# Patient Record
Sex: MALE | Race: WHITE
[De-identification: names, ages, dates, MRNs, and addresses within clinical notes are randomized per-mention and may not be internally consistent; named-entity substitution may affect disease eponyms.]

---

## 2020-08-24 ENCOUNTER — HOSPITAL ENCOUNTER (OUTPATIENT)
Dept: HOSPITAL 47 - EC | Age: 20
Setting detail: OBSERVATION
LOS: 2 days | Discharge: HOME | End: 2020-08-26
Payer: COMMERCIAL

## 2020-08-24 VITALS — BODY MASS INDEX: 22.3 KG/M2

## 2020-08-24 DIAGNOSIS — N18.1: ICD-10-CM

## 2020-08-24 DIAGNOSIS — R60.0: Primary | ICD-10-CM

## 2020-08-24 DIAGNOSIS — E87.1: ICD-10-CM

## 2020-08-24 DIAGNOSIS — E87.70: ICD-10-CM

## 2020-08-24 DIAGNOSIS — Z79.52: ICD-10-CM

## 2020-08-24 DIAGNOSIS — T50.2X5A: ICD-10-CM

## 2020-08-24 DIAGNOSIS — E87.6: ICD-10-CM

## 2020-08-24 DIAGNOSIS — J45.909: ICD-10-CM

## 2020-08-24 DIAGNOSIS — N03.8: ICD-10-CM

## 2020-08-24 DIAGNOSIS — Z88.0: ICD-10-CM

## 2020-08-24 LAB
ALBUMIN SERPL-MCNC: 2.1 G/DL (ref 3.5–5)
ALBUMIN SERPL-MCNC: 2.1 G/DL (ref 3.5–5)
ALP SERPL-CCNC: 70 U/L (ref 38–126)
ALP SERPL-CCNC: 75 U/L (ref 38–126)
ALT SERPL-CCNC: 18 U/L (ref 4–49)
ALT SERPL-CCNC: 19 U/L (ref 4–49)
ANION GAP SERPL CALC-SCNC: -1 MMOL/L
ANION GAP SERPL CALC-SCNC: -1 MMOL/L
AST SERPL-CCNC: 28 U/L (ref 17–59)
AST SERPL-CCNC: 28 U/L (ref 17–59)
BASOPHILS # BLD AUTO: 0 K/UL (ref 0–0.2)
BASOPHILS NFR BLD AUTO: 0 %
BUN SERPL-SCNC: 11 MG/DL (ref 9–20)
BUN SERPL-SCNC: 11 MG/DL (ref 9–20)
CALCIUM SPEC-MCNC: 7.6 MG/DL (ref 8.4–10.2)
CALCIUM SPEC-MCNC: 7.7 MG/DL (ref 8.4–10.2)
CHLORIDE SERPL-SCNC: 105 MMOL/L (ref 98–107)
CHLORIDE SERPL-SCNC: 106 MMOL/L (ref 98–107)
CO2 SERPL-SCNC: 26 MMOL/L (ref 22–30)
CO2 SERPL-SCNC: 26 MMOL/L (ref 22–30)
EOSINOPHIL # BLD AUTO: 0.1 K/UL (ref 0–0.7)
EOSINOPHIL NFR BLD AUTO: 2 %
ERYTHROCYTE [DISTWIDTH] IN BLOOD BY AUTOMATED COUNT: 5.92 M/UL (ref 4.3–5.9)
ERYTHROCYTE [DISTWIDTH] IN BLOOD: 12.6 % (ref 11.5–15.5)
GLUCOSE SERPL-MCNC: 104 MG/DL (ref 74–99)
GLUCOSE SERPL-MCNC: 115 MG/DL (ref 74–99)
HCT VFR BLD AUTO: 50.2 % (ref 39–53)
HGB BLD-MCNC: 16.5 GM/DL (ref 13–17.5)
INR PPP: 1 (ref ?–1.2)
LYMPHOCYTES # SPEC AUTO: 0.6 K/UL (ref 1–4.8)
LYMPHOCYTES NFR SPEC AUTO: 11 %
MAGNESIUM SPEC-SCNC: 1.9 MG/DL (ref 1.6–2.3)
MCH RBC QN AUTO: 27.9 PG (ref 25–35)
MCHC RBC AUTO-ENTMCNC: 32.9 G/DL (ref 31–37)
MCV RBC AUTO: 84.8 FL (ref 80–100)
MONOCYTES # BLD AUTO: 0.2 K/UL (ref 0–1)
MONOCYTES NFR BLD AUTO: 4 %
NEUTROPHILS # BLD AUTO: 4.8 K/UL (ref 1.3–7.7)
NEUTROPHILS NFR BLD AUTO: 83 %
PH UR: 7.5 [PH] (ref 5–8)
PLATELET # BLD AUTO: 298 K/UL (ref 150–450)
POTASSIUM SERPL-SCNC: 4.2 MMOL/L (ref 3.5–5.1)
POTASSIUM SERPL-SCNC: 4.6 MMOL/L (ref 3.5–5.1)
PROT SERPL-MCNC: 4.6 G/DL (ref 6.3–8.2)
PROT SERPL-MCNC: 4.6 G/DL (ref 6.3–8.2)
PROT UR QL: (no result)
PT BLD: 10 SEC (ref 9–12)
RBC UR QL: 1 /HPF (ref 0–5)
SODIUM SERPL-SCNC: 130 MMOL/L (ref 137–145)
SODIUM SERPL-SCNC: 131 MMOL/L (ref 137–145)
SP GR UR: 1.03 (ref 1–1.03)
UROBILINOGEN UR QL STRIP: 2 MG/DL (ref ?–2)
WBC # BLD AUTO: 5.9 K/UL (ref 4–11)
WBC # UR AUTO: 2 /HPF (ref 0–5)

## 2020-08-24 PROCEDURE — 86255 FLUORESCENT ANTIBODY SCREEN: CPT

## 2020-08-24 PROCEDURE — 77012 CT SCAN FOR NEEDLE BIOPSY: CPT

## 2020-08-24 PROCEDURE — 84156 ASSAY OF PROTEIN URINE: CPT

## 2020-08-24 PROCEDURE — 84165 PROTEIN E-PHORESIS SERUM: CPT

## 2020-08-24 PROCEDURE — 86900 BLOOD TYPING SEROLOGIC ABO: CPT

## 2020-08-24 PROCEDURE — 86901 BLOOD TYPING SEROLOGIC RH(D): CPT

## 2020-08-24 PROCEDURE — 86335 IMMUNFIX E-PHORSIS/URINE/CSF: CPT

## 2020-08-24 PROCEDURE — 80074 ACUTE HEPATITIS PANEL: CPT

## 2020-08-24 PROCEDURE — 86334 IMMUNOFIX E-PHORESIS SERUM: CPT

## 2020-08-24 PROCEDURE — 83735 ASSAY OF MAGNESIUM: CPT

## 2020-08-24 PROCEDURE — 80053 COMPREHEN METABOLIC PANEL: CPT

## 2020-08-24 PROCEDURE — 96361 HYDRATE IV INFUSION ADD-ON: CPT

## 2020-08-24 PROCEDURE — 81001 URINALYSIS AUTO W/SCOPE: CPT

## 2020-08-24 PROCEDURE — 96374 THER/PROPH/DIAG INJ IV PUSH: CPT

## 2020-08-24 PROCEDURE — 86850 RBC ANTIBODY SCREEN: CPT

## 2020-08-24 PROCEDURE — 86160 COMPLEMENT ANTIGEN: CPT

## 2020-08-24 PROCEDURE — 36415 COLL VENOUS BLD VENIPUNCTURE: CPT

## 2020-08-24 PROCEDURE — 85610 PROTHROMBIN TIME: CPT

## 2020-08-24 PROCEDURE — 86225 DNA ANTIBODY NATIVE: CPT

## 2020-08-24 PROCEDURE — 80061 LIPID PANEL: CPT

## 2020-08-24 PROCEDURE — 86038 ANTINUCLEAR ANTIBODIES: CPT

## 2020-08-24 PROCEDURE — 86162 COMPLEMENT TOTAL (CH50): CPT

## 2020-08-24 PROCEDURE — 96376 TX/PRO/DX INJ SAME DRUG ADON: CPT

## 2020-08-24 PROCEDURE — 85025 COMPLETE CBC W/AUTO DIFF WBC: CPT

## 2020-08-24 PROCEDURE — 99285 EMERGENCY DEPT VISIT HI MDM: CPT

## 2020-08-24 PROCEDURE — 82570 ASSAY OF URINE CREATININE: CPT

## 2020-08-24 PROCEDURE — 76770 US EXAM ABDO BACK WALL COMP: CPT

## 2020-08-24 PROCEDURE — 50200 RENAL BIOPSY PERQ: CPT

## 2020-08-24 RX ADMIN — FUROSEMIDE SCH MG: 10 INJECTION, SOLUTION INTRAMUSCULAR; INTRAVENOUS at 14:29

## 2020-08-24 RX ADMIN — FUROSEMIDE SCH MG: 10 INJECTION, SOLUTION INTRAMUSCULAR; INTRAVENOUS at 20:46

## 2020-08-24 NOTE — ED
General Adult HPI





- General


Chief complaint: Recheck/Abnormal Lab/Rx


Stated complaint: kidney issue relaps


Time Seen by Provider: 08/24/20 11:55


Source: patient, RN notes reviewed, old records reviewed


Mode of arrival: ambulatory


Limitations: no limitations





- History of Present Illness


Initial comments: 





This is a 20-year-old male who has a past medical history significant for 

nephrotic syndrome.  Patient states his numbers been rising recently but the 

refusing here today because he had edema increasing in both legs bilaterally.  

Patient also states she's had a little sensation that he has something stuck in 

his throat this happen before when he has swelling in his feet.  Patient denies 

any difficulty breathing shortest breath.  Patient denies any recent fever 

chills or cough per patient denies any chest pain or palpitations patient denies

any abdominal pain patient denies any nausea vomiting or diarrhea.





- Related Data


                                Home Medications











 Medication  Instructions  Recorded  Confirmed


 


Multivitamins, Thera [Theragran] 1 each PO DAILY@1200 04/05/15 04/05/15








                                  Previous Rx's











 Medication  Instructions  Recorded


 


diphenhydrAMINE [Benadryl] 1 - 2 tab PO Q6HR PRN #30 capsule 10/07/16











                                    Allergies











Allergy/AdvReac Type Severity Reaction Status Date / Time


 


Penicillins Allergy  Unknown Verified 08/24/20 11:58














Review of Systems


ROS Statement: 


Those systems with pertinent positive or pertinent negative responses have been 

documented in the HPI.





ROS Other: All systems not noted in ROS Statement are negative.





Past Medical History


Past Medical History: Asthma


Additional Past Medical History / Comment(s): nephrotic syndrome


History of Any Multi-Drug Resistant Organisms: None Reported


Past Surgical History: No Surgical Hx Reported


Past Psychological History: No Psychological Hx Reported


Past Alcohol Use History: None Reported


Past Drug Use History: None Reported





General Exam





- General Exam Comments


Initial Comments: 





GENERAL:


Patient is well-developed and well-nourished.  Patient is nontoxic and well-

hydrated and is in mild distress.





ENT:


Neck is soft and supple.  No significant lymphadenopathy is noted.  Oropharynx 

is clear.  Moist mucous membranes.  Neck has full range of motion without 

eliciting any pain.  





EYES:


The sclera were anicteric and conjunctiva were pink and moist.  Extraocular 

movements were intact and pupils were equal round and reactive to light.  

Eyelids were unremarkable.





PULMONARY:


Unlabored respirations.  Good breath sounds bilaterally.  No audible rales 

rhonchi or wheezing was noted.





CARDIOVASCULAR:


There is a regular rate and rhythm without any murmurs gallops or rubs.  





ABDOMEN:


Soft and nontender with normal bowel sounds. 





SKIN:


Skin is clear with no lesions or rashes and otherwise unremarkable.





NEUROLOGIC:


Patient is alert and oriented x3.  Cranial nerves II through XII are grossly 

intact.  Motor and sensory are also intact.  Normal speech, volume and content. 

 Symmetrical smile.  





MUSCULOSKELETAL:


Normal extremities with adequate strength and full range of motion.  Edema 1+ 

bilaterally





LYMPHATICS:


No significant lymphadenopathy is noted





PSYCHIATRIC:


Normal psychiatric evaluation. 


Limitations: no limitations





Course


                                   Vital Signs











  08/24/20





  11:53


 


Temperature 98.0 F


 


Pulse Rate 84


 


Respiratory 18





Rate 


 


Blood Pressure 123/76


 


O2 Sat by Pulse 99





Oximetry 














Medical Decision Making





- Medical Decision Making





Dr. Trevino came into the emergency department saw the patient wanted the patient

 admitted.





I spoke with Dr. Agosto she agreed to admit the patient admitted the patient 

wrote admitting orders.





- Lab Data


Result diagrams: 


                                 08/24/20 12:26





                                 08/24/20 12:26


                                   Lab Results











  08/24/20 08/24/20 08/24/20 Range/Units





  12:26 12:26 12:26 


 


WBC  5.9    (4.0-11.0)  k/uL


 


RBC  5.92 H    (4.30-5.90)  m/uL


 


Hgb  16.5    (13.0-17.5)  gm/dL


 


Hct  50.2    (39.0-53.0)  %


 


MCV  84.8    (80.0-100.0)  fL


 


MCH  27.9    (25.0-35.0)  pg


 


MCHC  32.9    (31.0-37.0)  g/dL


 


RDW  12.6    (11.5-15.5)  %


 


Plt Count  298    (150-450)  k/uL


 


Neutrophils %  83    %


 


Lymphocytes %  11    %


 


Monocytes %  4    %


 


Eosinophils %  2    %


 


Basophils %  0    %


 


Neutrophils #  4.8    (1.3-7.7)  k/uL


 


Lymphocytes #  0.6 L    (1.0-4.8)  k/uL


 


Monocytes #  0.2    (0-1.0)  k/uL


 


Eosinophils #  0.1    (0-0.7)  k/uL


 


Basophils #  0.0    (0-0.2)  k/uL


 


Sodium    131 L  (137-145)  mmol/L


 


Potassium    4.2  (3.5-5.1)  mmol/L


 


Chloride    106  ()  mmol/L


 


Carbon Dioxide    26  (22-30)  mmol/L


 


Anion Gap    -1  mmol/L


 


BUN    11  (9-20)  mg/dL


 


Creatinine    0.74  (0.66-1.25)  mg/dL


 


Est GFR (CKD-EPI)AfAm    >90  (>60 ml/min/1.73 sqM)  


 


Est GFR (CKD-EPI)NonAf    >90  (>60 ml/min/1.73 sqM)  


 


Glucose    115 H  (74-99)  mg/dL


 


Calcium    7.6 L  (8.4-10.2)  mg/dL


 


Total Bilirubin    0.6  (0.2-1.3)  mg/dL


 


AST    28  (17-59)  U/L


 


ALT    19  (4-49)  U/L


 


Alkaline Phosphatase    75  ()  U/L


 


Total Protein    4.6 L  (6.3-8.2)  g/dL


 


Albumin    2.1 L  (3.5-5.0)  g/dL


 


Urine Color   Yellow   


 


Urine Appearance   Clear   (Clear)  


 


Urine pH   7.5   (5.0-8.0)  


 


Ur Specific Gravity   1.034   (1.001-1.035)  


 


Urine Protein   4+ H   (Negative)  


 


Urine Glucose (UA)   Negative   (Negative)  


 


Urine Ketones   Negative   (Negative)  


 


Urine Blood   Small H   (Negative)  


 


Urine Nitrite   Negative   (Negative)  


 


Urine Bilirubin   Negative   (Negative)  


 


Urine Urobilinogen   2.0   (<2.0)  mg/dL


 


Ur Leukocyte Esterase   Negative   (Negative)  


 


Urine RBC   1   (0-5)  /hpf


 


Urine WBC   2   (0-5)  /hpf


 


Urine Bacteria   Rare H   (None)  /hpf


 


Urine Mucus   Occasional H   (None)  /hpf














Disposition


Clinical Impression: 


 Nephrotic syndrome





Disposition: ADMITTED AS IP TO THIS [facility]


Time of Disposition: 13:39

## 2020-08-24 NOTE — US
EXAMINATION TYPE: US kidneys/renal and bladder

 

DATE OF EXAM: 8/24/2020

 

COMPARISON: NONE

 

CLINICAL HISTORY: 20-year-old male with acute kidney injury, abnormal labs.  Patient states no pain. 
 

 

TECHNIQUE: Multiple sonographic images of the kidneys and bladder are obtained.

 

FINDINGS:

 

EXAM MEASUREMENTS:

 

Right Kidney:  12.3 x 5.9 x 4.4 cm

Left Kidney: 11.4 x 5.2 x 6.3 cm

 

 

 

Right Kidney: No hydronephrosis.

Left Kidney: No hydronephrosis. Possible trace perinephric edema.

 

 

Bladder: Mildly distended but with apparent circumferential wall thickening up to 6 mm.

**Bilateral Jets not seen

 

***Mild-to-moderate free fluid seen in midline pelvis

 

 

 

IMPRESSION:

 

1. No hydronephrosis.

2. Possible mild left sided perinephric fluid of unclear etiology.

3. Mild to moderate pelvic free fluid may be physiologic. Further clinical correlation recommended.

4. Circumferential bladder wall thickening. Correlate to exclude cystitis.

## 2020-08-24 NOTE — P.NPCON
History of Present Illness





- Reason for Consult


proteinuria





- History of Present Illness





Reason for consultation: Proteinuria





History of present illness:


Patient is a 20-year-old male seen in renal consultation for proteinuria.  

Patient has history of nephrotic syndrome for which she is maintained on pred

nisone 5 mg every other day.  Patient states he was following with the pediatric

nephrologist outpatient but now doesn't want to drive out to [place] and 

therefore recently started seeing me in the office.  Patient denies any kidney 

biopsies in the past.  Patient states about a week ago he noticed swelling in 

his lower extremities which progressively got worse over the last few days.  

Patient believes he is having another relapse.  He denies chest pain or 

shortness of breath.  Good urine output.  No hematuria or dysuria.  No vomiting 

or diarrhea.  Blood pressure well controlled.  He is noted to have 4+ 

proteinuria on UA.  His albumin is low at 2.1.  No history of IV drug abuse.  

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  Denies regular use of 

nonsteroidals.





Vital signs are stable.


General: The patient appeared well nourished and normally developed. 


HEENT: Head exam is unremarkable. Neck is without jugular venous distension.


LUNGS: Lungs are clear to auscultation and percussion. Breath sounds decreased.


HEART: Rate and Rhythm are regular. 


ABDOMEN: Soft, nontender.


EXTREMITITES: 2+ edema.





Past Medical History


Past Medical History: Asthma


Additional Past Medical History / Comment(s): nephrotic syndrome


History of Any Multi-Drug Resistant Organisms: None Reported


Past Surgical History: No Surgical Hx Reported


Past Psychological History: No Psychological Hx Reported


Past Alcohol Use History: None Reported


Past Drug Use History: None Reported





Medications and Allergies


                                Home Medications











 Medication  Instructions  Recorded  Confirmed  Type


 


Multivitamins, Thera [Theragran] 1 each PO DAILY@1200 04/05/15 04/05/15 History


 


diphenhydrAMINE [Benadryl] 1 - 2 tab PO Q6HR PRN #30 capsule 10/07/16  Rx








                                    Allergies











Allergy/AdvReac Type Severity Reaction Status Date / Time


 


Penicillins Allergy  Unknown Verified 08/24/20 11:58














Physical Exam


Vitals: 


                                   Vital Signs











  Temp Pulse Resp BP Pulse Ox


 


 08/24/20 11:53  98.0 F  84  18  123/76  99








                                Intake and Output











 08/23/20 08/24/20 08/24/20





 22:59 06:59 14:59


 


Other:   


 


  Weight   85.275 kg














Results





- Lab Results


                             Most recent lab results











WBC  5.9 k/uL (4.0-11.0)   08/24/20  12:26    


 


RBC  5.92 m/uL (4.30-5.90)  H  08/24/20  12:26    


 


Hgb  16.5 gm/dL (13.0-17.5)   08/24/20  12:26    


 


Hct  50.2 % (39.0-53.0)   08/24/20  12:26    


 


MCV  84.8 fL (80.0-100.0)   08/24/20  12:26    


 


MCH  27.9 pg (25.0-35.0)   08/24/20  12:26    


 


MCHC  32.9 g/dL (31.0-37.0)   08/24/20  12:26    


 


RDW  12.6 % (11.5-15.5)   08/24/20  12:26    


 


Plt Count  298 k/uL (150-450)   08/24/20  12:26    


 


Neutrophils %  83 %  08/24/20  12:26    


 


Lymphocytes %  11 %  08/24/20  12:26    


 


Monocytes %  4 %  08/24/20  12:26    


 


Eosinophils %  2 %  08/24/20  12:26    


 


Basophils %  0 %  08/24/20  12:26    


 


Neutrophils #  4.8 k/uL (1.3-7.7)   08/24/20  12:26    


 


Lymphocytes #  0.6 k/uL (1.0-4.8)  L  08/24/20  12:26    


 


Monocytes #  0.2 k/uL (0-1.0)   08/24/20  12:26    


 


Eosinophils #  0.1 k/uL (0-0.7)   08/24/20  12:26    


 


Basophils #  0.0 k/uL (0-0.2)   08/24/20  12:26    


 


Sodium  131 mmol/L (137-145)  L  08/24/20  12:26    


 


Potassium  4.2 mmol/L (3.5-5.1)   08/24/20  12:26    


 


Chloride  106 mmol/L ()   08/24/20  12:26    


 


Carbon Dioxide  26 mmol/L (22-30)   08/24/20  12:26    


 


Anion Gap  -1 mmol/L  08/24/20  12:26    


 


BUN  11 mg/dL (9-20)   08/24/20  12:26    


 


Creatinine  0.74 mg/dL (0.66-1.25)   08/24/20  12:26    


 


Est GFR (CKD-EPI)AfAm  >90  (>60 ml/min/1.73 sqM)   08/24/20  12:26    


 


Est GFR (CKD-EPI)NonAf  >90  (>60 ml/min/1.73 sqM)   08/24/20  12:26    


 


Glucose  115 mg/dL (74-99)  H  08/24/20  12:26    


 


Calcium  7.6 mg/dL (8.4-10.2)  L  08/24/20  12:26    


 


Total Bilirubin  0.6 mg/dL (0.2-1.3)   08/24/20  12:26    


 


AST  28 U/L (17-59)   08/24/20  12:26    


 


ALT  19 U/L (4-49)   08/24/20  12:26    


 


Alkaline Phosphatase  75 U/L ()   08/24/20  12:26    


 


Total Protein  4.6 g/dL (6.3-8.2)  L  08/24/20  12:26    


 


Albumin  2.1 g/dL (3.5-5.0)  L  08/24/20  12:26    














                                 08/24/20 12:26





                                 08/24/20 12:26





Assessment and Plan


Plan: 





Assessment:


1.  Nephrotic syndrome.  Etiology is most likely minimal-change disease.  Patie

nt states he's never had a kidney biopsy before.  He's had 7 relapses in the 

past.  GFR at baseline.  Maintained on prednisone 5 mg every other day at home.


2.  Edema secondary to nephrotic syndrome.  


3.  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





Plan:


Lasix 40 mg IV twice daily.  


Quantify proteinuria.


Check renal ultrasound.


Check serologies.


Schedule for CT-guided kidney biopsy for definitive diagnosis.


Start prednisone 60 mg once daily.


Low-salt diet and 1500 mL fluid restriction per day.


Will also benefit from KINDRA inhibition.


Check lipid panel.





Thank you for the consultation.  I will continue to follow the patient with you 

during his hospital stay.

## 2020-08-25 VITALS — SYSTOLIC BLOOD PRESSURE: 122 MMHG | HEART RATE: 66 BPM | TEMPERATURE: 98.8 F | DIASTOLIC BLOOD PRESSURE: 59 MMHG

## 2020-08-25 LAB
ALBUMIN SERPL-MCNC: 1.9 G/DL (ref 3.5–5)
ALP SERPL-CCNC: 66 U/L (ref 38–126)
ALT SERPL-CCNC: 16 U/L (ref 4–49)
ANION GAP SERPL CALC-SCNC: -1 MMOL/L
AST SERPL-CCNC: 22 U/L (ref 17–59)
BUN SERPL-SCNC: 11 MG/DL (ref 9–20)
C-ANCA TITR SER: (no result) TITER
CALCIUM SPEC-MCNC: 7.8 MG/DL (ref 8.4–10.2)
CHLORIDE SERPL-SCNC: 103 MMOL/L (ref 98–107)
CHOLEST SERPL-MCNC: 404 MG/DL (ref ?–200)
CO2 SERPL-SCNC: 29 MMOL/L (ref 22–30)
DSDNA AB SER QL: NEGATIVE
DSDNA AB TITR SER: <1 IU/ML
GLUCOSE SERPL-MCNC: 107 MG/DL (ref 74–99)
HDLC SERPL-MCNC: 118 MG/DL (ref 40–60)
LDLC SERPL CALC-MCNC: 265 MG/DL (ref 0–99)
MAGNESIUM SPEC-SCNC: 1.9 MG/DL (ref 1.6–2.3)
POTASSIUM SERPL-SCNC: 3.6 MMOL/L (ref 3.5–5.1)
PROT SERPL-MCNC: 4.2 G/DL (ref 6.3–8.2)
SODIUM SERPL-SCNC: 131 MMOL/L (ref 137–145)
TRIGL SERPL-MCNC: 107 MG/DL (ref ?–150)

## 2020-08-25 RX ADMIN — FUROSEMIDE SCH MG: 10 INJECTION, SOLUTION INTRAMUSCULAR; INTRAVENOUS at 08:52

## 2020-08-25 RX ADMIN — FUROSEMIDE SCH MG: 10 INJECTION, SOLUTION INTRAMUSCULAR; INTRAVENOUS at 20:33

## 2020-08-25 NOTE — P.PN
Subjective





Patient is seen in follow-up for nephrotic syndrome.  Edema improving with IV 

diuresis.  Maintained on prednisone 60 mg once daily.  Oral intake fair.  No 

vomiting or diarrhea.  Scheduled for kidney biopsy today. 





Vital signs are stable.


General: The patient appeared well nourished and normally developed. 


HEENT: Head exam is unremarkable. Neck is without jugular venous distension.


LUNGS: Lungs are clear to auscultation and percussion. Breath sounds decreased.


HEART: Rate and Rhythm are regular. First and second heart sounds normal. No 

murmurs, rubs or gallops. 


ABDOMEN: Soft, nontender.


EXTREMITITES: 1+ edema.





Objective





- Vital Signs


Vital signs: 


                                   Vital Signs











Temp  97.8 F   08/24/20 20:44


 


Pulse  95   08/25/20 03:00


 


Resp  17   08/25/20 03:00


 


BP  122/61   08/25/20 03:00


 


Pulse Ox  98   08/25/20 03:00








                                 Intake & Output











 08/24/20 08/25/20 08/25/20





 18:59 06:59 18:59


 


Output Total  1900 


 


Balance  -1900 


 


Weight 85.275 kg  


 


Output:   


 


  Urine  1900 


 


Other:   


 


  Voiding Method Toilet Toilet 


 


  # Voids  1 














- Labs


CBC & Chem 7: 


                                 08/24/20 12:26





                                 08/24/20 15:13


Labs: 


                  Abnormal Lab Results - Last 24 Hours (Table)











  08/24/20 08/24/20 08/24/20 Range/Units





  12:26 12:26 12:26 


 


RBC  5.92 H    (4.30-5.90)  m/uL


 


Lymphocytes #  0.6 L    (1.0-4.8)  k/uL


 


Sodium    131 L  (137-145)  mmol/L


 


Glucose    115 H  (74-99)  mg/dL


 


Calcium    7.6 L  (8.4-10.2)  mg/dL


 


Total Protein    4.6 L  (6.3-8.2)  g/dL


 


Albumin    2.1 L  (3.5-5.0)  g/dL


 


Urine Protein   4+ H   (Negative)  


 


Urine Blood   Small H   (Negative)  


 


Urine Bacteria   Rare H   (None)  /hpf


 


Urine Mucus   Occasional H   (None)  /hpf














  08/24/20 Range/Units





  15:13 


 


RBC   (4.30-5.90)  m/uL


 


Lymphocytes #   (1.0-4.8)  k/uL


 


Sodium  130 L  (137-145)  mmol/L


 


Glucose  104 H  (74-99)  mg/dL


 


Calcium  7.7 L  (8.4-10.2)  mg/dL


 


Total Protein  4.6 L  (6.3-8.2)  g/dL


 


Albumin  2.1 L  (3.5-5.0)  g/dL


 


Urine Protein   (Negative)  


 


Urine Blood   (Negative)  


 


Urine Bacteria   (None)  /hpf


 


Urine Mucus   (None)  /hpf














Assessment and Plan


Plan: 





Assessment:


1.  Nephrotic syndrome.  Etiology is most likely minimal-change disease.  

Patient states he's never had a kidney biopsy before.  He's had 7 relapses in 

the past.  GFR at baseline.  Was maintained on prednisone 5 mg every other day 

at home.  No hydronephrosis noted on kidney ultrasound.


2.  Edema secondary to nephrotic syndrome.  


3.  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





Plan:


Lasix 40 mg IV twice daily.  


Check serologies.


Scheduled for CT-guided kidney biopsy for definitive diagnosis.


Maintain prednisone 60 mg once daily - started August 24.  


Low-salt diet and 1500 mL fluid restriction per day.


Add low-dose lisinopril.

## 2020-08-25 NOTE — P.HPIM
History of Present Illness


H&P Date: 08/25/20


Chief Complaint: Lower extremity edema, foamy urine


This is a 20-year-old gentleman with  history of asthma as a child, nephrotic 

syndrome on oral steroids;patient reports in 2015 that he had kidney issues, 

transferred to [facility], no biopsy was obtained at that time.  

Previously followed with pediatric nephrologist in [place], recently converted 

to seeing Dr. Trevino, related to being closer to home. 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 

taking any supplements, no NSAIDs, no recreational drug use, developed dry 

throat, bilateral lower extremity edema times nearly a week and good urine 

outputfoamy darkened urine.  Denies dysuria, no hematuria. Reports presentation

similar to prior one in 2015.  Denies any chest pain, palpitations, shortness of

breath.  Denies any fever or chills.  Denies any cough.  Denies nausea vomiting 

or diarrhea.  Denies abdominal pain.  Afebrile, normal WBC.  Albumin 2.1 Sodium 

1:30, anion gap -1 BUN 11, creatinine 0.74, bicarb 26, glucose 104 to 115.  UA 

reported 4+ protein, total protein greater than 600, occasional mucus, rare 

bacteria urine creatinine 278.3, rare bacteria negative nitrates, specific 

gravity 1.034.  Hepatitis A serology nonreactive.  Abdomen/pelvis ultrasound 

reported no hydronephrosis, mild to moderate free fluid seen in midline pelvis, 

possible mild left-sided perinephric fluid of unclear etiology, circumferential 

bladder wall thickening, possibly cystitis.  Received IV fluids in the ER.  

Evaluated by nephrology, IV push Lasix and steroids initiated.  Patient is 

scheduled for kidney biopsy today.








Review of Systems





ROS Statement: 


Those systems with pertinent positive or pertinent negative responses have been 

documented in the HPI.





ROS Other: All systems not noted in ROS Statement are negative.








Past Medical History


Past Medical History: Asthma, Renal Disease


Additional Past Medical History / Comment(s): nephrotic syndrome, asthma as a 

child


History of Any Multi-Drug Resistant Organisms: None Reported


Past Surgical History: No Surgical Hx Reported


Past Anesthesia/Blood Transfusion Reactions: Unable to Obtain


Additional Past Anesthesia/Blood Transfusion Reaction / Comment(s): Pt has never

had surgery


Smoking Status: Never smoker





- Past Family History


  ** Mother


Additional Family Medical History / Comment(s): Mother has motion sickness.  Pt 

cannot recall any other hx.





  ** Father


History Unknown: Yes





Medications and Allergies


                                Home Medications











 Medication  Instructions  Recorded  Confirmed  Type


 


predniSONE 5 mg PO DAILY 08/24/20 08/24/20 History








                                    Allergies











Allergy/AdvReac Type Severity Reaction Status Date / Time


 


Penicillins Allergy  Unknown Verified 08/24/20 11:58














Physical Exam


Vitals: 


                                   Vital Signs











  Temp Pulse Pulse Resp BP BP Pulse Ox


 


 08/25/20 03:00    95  17   122/61  98


 


 08/24/20 20:44  97.8 F   91  16   118/71  98


 


 08/24/20 15:00  97.7 F   71  16   110/69  96


 


 08/24/20 14:35   71   18  113/70   96


 


 08/24/20 11:53  98.0 F  84   18  123/76   99








                                Intake and Output











 08/24/20 08/25/20 08/25/20





 22:59 06:59 14:59


 


Output Total 1900  


 


Balance -1900  


 


Output:   


 


  Urine 1900  


 


Other:   


 


  Voiding Method Toilet  


 


  # Voids  1 


 


  Weight 85.275 kg  











PHYSICAL EXAM:


VITAL SIGNS: As above


GENERAL: Sitting up in bed, no acute distress


HEENT:  Conjunctivae normal. eyes normal.  Mucosa dry


NECK:  No JVD. No thyroid enlargement. No LNs


CARDIOVASCULAR:  S1, S2 regular.. No murmur


RESPIRATION: Breath sounds diminished in the bases. No rhonchi or crackles. No 

bronchial breathing.


ABDOMEN:  Soft,  nontender . No guarding. no masses palpable. No ascites, No 

hepatosplenomegaly.Bowel sounds heard.


LEGS:  Bilateral lower extremity edema, denies calf pain.  Positive DP pulses.


PSYCHIATRY: Alert and oriented X3, mood and affect normal.


NERVOUS SYSTEM:  Cranial N 2-12 grossly normal. Moves all 4 limbs. No focal 

deficits.  Strength and sensation grossly intact..


Skin: Warm, dry, no rash 


Lymphatic system. No LN neck axilla.











Results


CBC & Chem 7: 


                                 08/24/20 12:26





                                 08/24/20 15:13


Labs: 


                  Abnormal Lab Results - Last 24 Hours (Table)











  08/24/20 08/24/20 08/24/20 Range/Units





  12:26 12:26 12:26 


 


RBC  5.92 H    (4.30-5.90)  m/uL


 


Lymphocytes #  0.6 L    (1.0-4.8)  k/uL


 


Sodium    131 L  (137-145)  mmol/L


 


Glucose    115 H  (74-99)  mg/dL


 


Calcium    7.6 L  (8.4-10.2)  mg/dL


 


Total Protein    4.6 L  (6.3-8.2)  g/dL


 


Albumin    2.1 L  (3.5-5.0)  g/dL


 


Urine Protein   4+ H   (Negative)  


 


Urine Blood   Small H   (Negative)  


 


Urine Bacteria   Rare H   (None)  /hpf


 


Urine Mucus   Occasional H   (None)  /hpf














  08/24/20 Range/Units





  15:13 


 


RBC   (4.30-5.90)  m/uL


 


Lymphocytes #   (1.0-4.8)  k/uL


 


Sodium  130 L  (137-145)  mmol/L


 


Glucose  104 H  (74-99)  mg/dL


 


Calcium  7.7 L  (8.4-10.2)  mg/dL


 


Total Protein  4.6 L  (6.3-8.2)  g/dL


 


Albumin  2.1 L  (3.5-5.0)  g/dL


 


Urine Protein   (Negative)  


 


Urine Blood   (Negative)  


 


Urine Bacteria   (None)  /hpf


 


Urine Mucus   (None)  /hpf














Thrombosis Risk Factor Assmnt





- Choose All That Apply


Any of the Below Risk Factors Present?: No


Other Risk Factors: No


Other congenital or acquired thrombophilia - If yes, enter type in comment: No


Thrombosis Risk Factor Assessment Level: Very Low Risk





Assessment and Plan


Assessment: 


Nephrotic syndrome, in a patient with prior relapse, on oral steroids


Bilateral lower extremity edema secondary to the above


Chronic kidney disease stage I 


History of childhood asthma, stable


























Plan: Continue on current medication regime ,monitoring and symptomatic 

treatment.  Fluid restrictions.  Evaluated by nephrology with recommendations 

noted.  Currently on IV push Lasix, steroids.NPO, scheduled for diagnostic 

kidney biopsy today.














The impression and plan of care has been dictated as directed.





:


I performed a history and examination of this patient,  discussed the same with 

the dictator.  I agree with the dictator's note ,documented as a scribe.  Any 

additional findings or plans will be noted.

## 2020-08-25 NOTE — P.OP
Date of Procedure: 08/25/20


Preoperative Diagnosis: 


nephrotic syndrome, proteinuria


Procedure(s) Performed: 


CT-guided left renal parenchymal biopsy


Anesthesia: local


Surgeon: Mimi Mccray


Estimated Blood Loss (ml): 1


Pathology: other (3 18G cores submitted for pathology)


Condition: stable


Disposition: observation


Operative Findings: 


3 18G core biopsies of the left kidney lower pole obtained. Small perinephric 

hemorrhage post biopsy. Pt remained stable throughout procedure.

## 2020-08-26 VITALS — RESPIRATION RATE: 18 BRPM

## 2020-08-26 LAB
ALBUMIN SERPL ELPH-MCNC: 2.11 G/DL (ref 3.8–4.9)
ALBUMIN SERPL-MCNC: 1.8 G/DL (ref 3.5–5)
ALP SERPL-CCNC: 60 U/L (ref 38–126)
ALT SERPL-CCNC: 16 U/L (ref 4–49)
ANION GAP SERPL CALC-SCNC: -1 MMOL/L
AST SERPL-CCNC: 21 U/L (ref 17–59)
BUN SERPL-SCNC: 11 MG/DL (ref 9–20)
CALCIUM SPEC-MCNC: 7.7 MG/DL (ref 8.4–10.2)
CHLORIDE SERPL-SCNC: 102 MMOL/L (ref 98–107)
CO2 SERPL-SCNC: 30 MMOL/L (ref 22–30)
GLUCOSE SERPL-MCNC: 112 MG/DL (ref 74–99)
MAGNESIUM SPEC-SCNC: 2 MG/DL (ref 1.6–2.3)
POTASSIUM SERPL-SCNC: 3.3 MMOL/L (ref 3.5–5.1)
PROT SERPL-MCNC: 4 G/DL (ref 6.3–8.2)
SODIUM SERPL-SCNC: 131 MMOL/L (ref 137–145)

## 2020-08-26 RX ADMIN — FUROSEMIDE SCH: 10 INJECTION, SOLUTION INTRAMUSCULAR; INTRAVENOUS at 09:46

## 2020-08-26 RX ADMIN — FUROSEMIDE SCH MG: 10 INJECTION, SOLUTION INTRAMUSCULAR; INTRAVENOUS at 09:38

## 2020-08-26 NOTE — CT
EXAMINATION TYPE: CT biopsy renal LT

 

DATE OF EXAM: 8/25/2020

 

HISTORY: Nephrotic syndrome. Proteinuria.

 

COMPARISON: Ultrasound kidneys 8/24/2020

 

: Dr. Mimi Mccray 

 

The procedure was discussed with the patient. The risks, complications, benefits, and alternatives we
re discussed and any questions were answered. Informed consent was obtained. The patient was placed p
[place].

 

Preliminary CT imaging demonstrated grossly symmetric kidneys. The skin overlying a suitable path to 
the right kidney was localized using CT and the overlying skin was prepped and draped utilizing maxim
al barrier technique. Lidocaine used for local anesthesia. A small skin nick was made with a scalpel.
 Using CT guidance, access was gained to the lesion with a 18-gauge coaxial core biopsy needle. 3 pas
ses were made in total. 3 18-gauge core specimen(s) submitted in for histopathology. All needles were
 removed and sterile bandage was applied.

 

Postprocedure CT imaging demonstrated expected small perinephric hemorrhage. Patient tolerated proced
ure well with no immediate complications. Patient is discharged in stable condition.

 

 

IMPRESSION: 

Successful CT-guided right renal parenchymal 18-gauge core biopsy. Pathology pending.

## 2020-08-26 NOTE — P.PN
Subjective





Patient is seen in follow-up for nephrotic syndrome.  Edema improving with IV 

diuresis.  Maintained on prednisone 60 mg once daily.  Oral intake fair.  No 

vomiting or diarrhea.  Status post kidney biopsy yesterday.





Vital signs are stable.


General: The patient appeared well nourished and normally developed. 


HEENT: Head exam is unremarkable. Neck is without jugular venous distension.


LUNGS: Lungs are clear to auscultation and percussion. Breath sounds decreased.


HEART: Rate and Rhythm are regular. First and second heart sounds normal. No 

murmurs, rubs or gallops. 


ABDOMEN: Soft, nontender.


EXTREMITITES: 1+ edema.





Objective





- Vital Signs


Vital signs: 


                                   Vital Signs











Temp  98.8 F   08/26/20 05:00


 


Pulse  66   08/26/20 05:00


 


Resp  18   08/26/20 05:00


 


BP  122/59   08/26/20 05:00


 


Pulse Ox  98   08/26/20 05:00








                                 Intake & Output











 08/25/20 08/26/20 08/26/20





 18:59 06:59 18:59


 


Output Total 2600  


 


Balance -2600  


 


Weight 85.275 kg  


 


Output:   


 


  Urine 2600  


 


Other:   


 


  # Voids  2 














- Labs


CBC & Chem 7: 


                                 08/24/20 12:26





                                 08/26/20 05:32


Labs: 


                  Abnormal Lab Results - Last 24 Hours (Table)











  08/24/20 08/24/20 08/25/20 Range/Units





  15:13 15:13 08:52 


 


Sodium    131 L  (137-145)  mmol/L


 


Potassium     (3.5-5.1)  mmol/L


 


Glucose    107 H  (74-99)  mg/dL


 


Calcium    7.8 L  (8.4-10.2)  mg/dL


 


Total Protein    4.2 L  (6.3-8.2)  g/dL


 


Total Protein (PEP)   4.4 L   (6.2-8.2)  g/dL


 


Albumin    1.9 L  (3.5-5.0)  g/dL


 


Cholesterol    404 H  (<200)  mg/dL


 


LDL Cholesterol, Calc    265 H  (0-99)  mg/dL


 


HDL Cholesterol    118 H  (40-60)  mg/dL


 


Tot Complement (CH50)  101 H    (42 - 95)  U/mL














  08/26/20 Range/Units





  05:32 


 


Sodium  131 L  (137-145)  mmol/L


 


Potassium  3.3 L  (3.5-5.1)  mmol/L


 


Glucose  112 H  (74-99)  mg/dL


 


Calcium  7.7 L  (8.4-10.2)  mg/dL


 


Total Protein  4.0 L  (6.3-8.2)  g/dL


 


Total Protein (PEP)   (6.2-8.2)  g/dL


 


Albumin  1.8 L  (3.5-5.0)  g/dL


 


Cholesterol   (<200)  mg/dL


 


LDL Cholesterol, Calc   (0-99)  mg/dL


 


HDL Cholesterol   (40-60)  mg/dL


 


Tot Complement (CH50)   (42 - 95)  U/mL














Assessment and Plan


Plan: 





Assessment:


1.  Nephrotic syndrome.  Etiology is most likely minimal-change disease.  

Patient states he's never had a kidney biopsy before.  He's had 7 relapses in 

the past.  GFR at baseline.  Was maintained on prednisone 5 mg every other day 

at home.  No hydronephrosis noted on kidney ultrasound.


2.  Edema secondary to nephrotic syndrome.  


3.  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


4.  Hypokalemia secondary to diuresis.


5.  Hypervolemic hyponatremia.





Plan:


I will change Lasix to 40 mg orally once daily.


Add K-Dur 10 milliequivalents once daily to be started tomorrow.


40 mEq k-dur today.


Follow-up serologies.  Negative so far.


Kidney biopsy results pending.


Maintain lisinopril.


Maintain prednisone 60 mg once daily - started August 24.  


Low-salt diet and 1500 mL fluid restriction per day.


Repeat BMP 2-3 days post discharge.  Follow up outpatient in 1 week.

## 2020-08-26 NOTE — P.DS
Providers


Date of admission: 


08/24/20 13:56





Expected date of discharge: 08/26/20


Attending physician: 


Truong Agosto





Consults: 





                                        





08/24/20 13:39


Consult Physician Urgent 


   Consulting Provider: Moiz Trevino


   Consult Reason/Comments: Nephrotic syndrome


   Do you want consulting provider notified?: Yes











Primary care physician: 


Truong Agosto





[facility] Course: 


Final Diagnoses:


Nephrotic syndrome, in a patient with prior relapse, on oral steroids


Proteinuria secondary to the above


Bilateral lower extremity edema secondary to the above


Chronic kidney disease stage I 


Hypokalemia secondary to diuresing


Hyponatremia, hypervolemic





History of childhood asthma, stable








Hospital course:This is a 20-year-old gentleman with  history of asthma as a 

child, nephrotic syndrome on oral steroids;patient reports in 2015 that he had 

kidney issues, transferred to [facility], no biopsy was obtained at 

that time.  Previously followed with pediatric nephrologist in [place], recently

converted to seeing Dr. Trevino, related to being closer to home. Patient reports

he works in auto parts store ,was not doing any strenuous activity, no workouts,

not taking any supplements, no NSAIDs, no recreational drug use, developed dry 

throat, bilateral lower extremity edema times nearly a week and good urine 

outputfoamy darkened urine.  Denies dysuria, no hematuria. Reports presentation

similar to prior one in 2015.  Denies any chest pain, palpitations, shortness of

breath.  Denies any fever or chills.  Denies any cough.  Denies nausea vomiting 

or diarrhea.  Denies abdominal pain.  Afebrile, normal WBC.  Albumin 2.1 Sodium 

1:30, anion gap -1 BUN 11, creatinine 0.74, bicarb 26, glucose 104 to 115.  UA 

reported 4+ protein, total protein greater than 600, occasional mucus, rare 

bacteria urine creatinine 278.3, rare bacteria negative nitrates, specific 

gravity 1.034.  Hepatitis A serology nonreactive.  Abdomen/pelvis ultrasound 

reported no hydronephrosis, mild to moderate free fluid seen in midline pelvis, 

possible mild left-sided perinephric fluid of unclear etiology, circumferential 

bladder wall thickening, possibly cystitis.  Received IV fluids in the ER.  

Evaluated by nephrology, IV push Lasix and steroids initiated.  Patient is 

scheduled for kidney biopsy today.








Underwent biopsy of left kidney, tolerated procedure well.  Biopsy results 

pending .Edema improving with IV push diuretics, fluid restrictions.  No 

hydronephrosis reported on kidney ultrasound.  Significant clinical improvement.

 Cleared by nephrology for discharge with DC recommendations of prednisone 60 mg

by mouth daily( patient to follow-up with Dr. Trevino in 1 week), potassium 

chloride 10 mEq by mouth daily, Lasix 40 mg by mouth daily, Protonix 40 mg by 

mouth daily, lisinopril 5 mg by mouth daily. CBC, BMP, Magnesium in 3 days. 

Patient will be discharged home in a stable condition with guarded prognosis.














The impression and plan of care has been dictated as directed.





:


I performed a history and examination of this patient,  discussed the same with 

the dictator.  I agree with the dictator's note ,documented as a scribe.  Any 

additional findings or plans will be noted.





Patient Condition at Discharge: Stable





Plan - Discharge Summary


Discharge Rx Participant: No


New Discharge Prescriptions: 


New


   predniSONE [Deltasone] 60 mg PO DAILY #90 tab


   Potassium Chloride ER [K-Dur 10] 10 meq PO DAILY #30 tab.er.prt


   Furosemide [Lasix] 40 mg PO DAILY #30 tab


   Pantoprazole [Protonix] 40 mg PO DAILY #30 tablet.


   lisinopriL [Zestril] 5 mg PO DAILY #30 tab





Discontinued


   predniSONE 5 mg PO DAILY


Discharge Medication List





Furosemide [Lasix] 40 mg PO DAILY #30 tab 08/26/20 [Rx]


Pantoprazole [Protonix] 40 mg PO DAILY #30 tablet. 08/26/20 [Rx]


Potassium Chloride ER [K-Dur 10] 10 meq PO DAILY #30 tab.er.prt 08/26/20 [Rx]


lisinopriL [Zestril] 5 mg PO DAILY #30 tab 08/26/20 [Rx]


predniSONE [Deltasone] 60 mg PO DAILY #90 tab 08/26/20 [Rx]








Follow up Appointment(s)/Referral(s): 


Truong Agosto DO [Primary Care Provider] - 3 Days


Moiz Trevino DO [STAFF PHYSICIAN] - 1 Week


Ambulatory/Diagnostic Orders: 


Complete Blood Count w/diff [LAB.AMB] Time Frame: 3 Days, Location: None 

Selected


Activity/Diet/Wound Care/Special Instructions: 


Diet: Low salt diet, 1500 MLS fluid restriction

## 2020-08-27 LAB — GAMMA GLOB SERPL ELPH-MCNC: 0.48 G/DL (ref 0.7–1.5)

## 2020-09-28 ENCOUNTER — HOSPITAL ENCOUNTER (OUTPATIENT)
Dept: HOSPITAL 47 - LABWHC1 | Age: 20
Discharge: HOME | End: 2020-09-28
Attending: NURSE PRACTITIONER
Payer: COMMERCIAL

## 2020-09-28 DIAGNOSIS — N05.0: Primary | ICD-10-CM

## 2020-09-28 PROCEDURE — 36415 COLL VENOUS BLD VENIPUNCTURE: CPT

## 2020-09-28 PROCEDURE — 83735 ASSAY OF MAGNESIUM: CPT

## 2020-11-02 ENCOUNTER — HOSPITAL ENCOUNTER (OUTPATIENT)
Dept: HOSPITAL 47 - LABWHC1 | Age: 20
Discharge: HOME | End: 2020-11-02
Attending: NURSE PRACTITIONER
Payer: COMMERCIAL

## 2020-11-02 DIAGNOSIS — N39.0: ICD-10-CM

## 2020-11-02 DIAGNOSIS — D64.9: ICD-10-CM

## 2020-11-02 DIAGNOSIS — N05.0: Primary | ICD-10-CM

## 2020-11-02 DIAGNOSIS — M10.9: ICD-10-CM

## 2020-11-02 DIAGNOSIS — R80.9: ICD-10-CM

## 2020-11-02 LAB
ERYTHROCYTE [DISTWIDTH] IN BLOOD BY AUTOMATED COUNT: 5.51 M/UL (ref 4.3–5.9)
ERYTHROCYTE [DISTWIDTH] IN BLOOD: 12.8 % (ref 11.5–15.5)
HCT VFR BLD AUTO: 49.4 % (ref 39–53)
HGB BLD-MCNC: 16 GM/DL (ref 13–17.5)
MCH RBC QN AUTO: 29 PG (ref 25–35)
MCHC RBC AUTO-ENTMCNC: 32.3 G/DL (ref 31–37)
MCV RBC AUTO: 89.7 FL (ref 80–100)
PH UR: 6 [PH] (ref 5–8)
PLATELET # BLD AUTO: 281 K/UL (ref 150–450)
PROT/CREAT UR-RTO: 0.07
SP GR UR: 1.02 (ref 1–1.03)
UROBILINOGEN UR QL STRIP: <2 MG/DL (ref ?–2)
WBC # BLD AUTO: 9.2 K/UL (ref 4–11)

## 2020-11-02 PROCEDURE — 84156 ASSAY OF PROTEIN URINE: CPT

## 2020-11-02 PROCEDURE — 36415 COLL VENOUS BLD VENIPUNCTURE: CPT

## 2020-11-02 PROCEDURE — 83540 ASSAY OF IRON: CPT

## 2020-11-02 PROCEDURE — 82570 ASSAY OF URINE CREATININE: CPT

## 2020-11-02 PROCEDURE — 83735 ASSAY OF MAGNESIUM: CPT

## 2020-11-02 PROCEDURE — 81003 URINALYSIS AUTO W/O SCOPE: CPT

## 2020-11-02 PROCEDURE — 84100 ASSAY OF PHOSPHORUS: CPT

## 2020-11-02 PROCEDURE — 80053 COMPREHEN METABOLIC PANEL: CPT

## 2020-11-02 PROCEDURE — 83550 IRON BINDING TEST: CPT

## 2020-11-02 PROCEDURE — 82728 ASSAY OF FERRITIN: CPT

## 2020-11-02 PROCEDURE — 84550 ASSAY OF BLOOD/URIC ACID: CPT

## 2020-11-02 PROCEDURE — 85027 COMPLETE CBC AUTOMATED: CPT

## 2020-11-03 LAB
ALBUMIN SERPL-MCNC: 4.3 G/DL (ref 3.8–4.9)
ALBUMIN/GLOB SERPL: 2.26 G/DL (ref 1.6–3.17)
ALP SERPL-CCNC: 55 U/L (ref 41–126)
ALT SERPL-CCNC: 15 U/L (ref 10–49)
ANION GAP SERPL CALC-SCNC: 7.9 MMOL/L (ref 4–12)
AST SERPL-CCNC: 13 U/L (ref 14–35)
BUN SERPL-SCNC: 15 MG/DL (ref 9–27)
BUN/CREAT SERPL: 16.67 RATIO (ref 12–20)
CALCIUM SPEC-MCNC: 9.6 MG/DL (ref 8.7–10.3)
CHLORIDE SERPL-SCNC: 105 MMOL/L (ref 96–109)
CO2 SERPL-SCNC: 28.1 MMOL/L (ref 21.6–31.8)
FERRITIN SERPL-MCNC: 56 NG/ML (ref 22–322)
GLOBULIN SER CALC-MCNC: 1.9 G/DL (ref 1.6–3.3)
GLUCOSE SERPL-MCNC: 86 MG/DL (ref 70–110)
IRON SERPL-MCNC: 109 UG/DL (ref 65–175)
MAGNESIUM SPEC-SCNC: 2.1 MG/DL (ref 1.5–2.4)
POTASSIUM SERPL-SCNC: 4.6 MMOL/L (ref 3.5–5.5)
PROT SERPL-MCNC: 6.2 G/DL (ref 6.2–8.2)
SODIUM SERPL-SCNC: 141 MMOL/L (ref 135–145)
TIBC SERPL-MCNC: 298 UG/DL (ref 228–460)
URATE SERPL-MCNC: 6.6 MG/DL (ref 3.7–8.7)

## 2020-12-02 ENCOUNTER — HOSPITAL ENCOUNTER (OUTPATIENT)
Dept: HOSPITAL 47 - LABWHC1 | Age: 20
Discharge: HOME | End: 2020-12-02
Attending: INTERNAL MEDICINE
Payer: COMMERCIAL

## 2020-12-02 DIAGNOSIS — N39.0: ICD-10-CM

## 2020-12-02 DIAGNOSIS — E55.9: Primary | ICD-10-CM

## 2020-12-02 DIAGNOSIS — N05.0: ICD-10-CM

## 2020-12-02 DIAGNOSIS — D64.9: ICD-10-CM

## 2020-12-02 DIAGNOSIS — R80.9: ICD-10-CM

## 2020-12-02 LAB
BASOPHILS # BLD AUTO: 0 K/UL (ref 0–0.2)
BASOPHILS NFR BLD AUTO: 0 %
EOSINOPHIL # BLD AUTO: 0.5 K/UL (ref 0–0.7)
EOSINOPHIL NFR BLD AUTO: 8 %
ERYTHROCYTE [DISTWIDTH] IN BLOOD BY AUTOMATED COUNT: 5.67 M/UL (ref 4.3–5.9)
ERYTHROCYTE [DISTWIDTH] IN BLOOD: 12.7 % (ref 11.5–15.5)
HCT VFR BLD AUTO: 47.9 % (ref 39–53)
HGB BLD-MCNC: 15.9 GM/DL (ref 13–17.5)
LYMPHOCYTES # SPEC AUTO: 1.2 K/UL (ref 1–4.8)
LYMPHOCYTES NFR SPEC AUTO: 18 %
MCH RBC QN AUTO: 28.1 PG (ref 25–35)
MCHC RBC AUTO-ENTMCNC: 33.3 G/DL (ref 31–37)
MCV RBC AUTO: 84.4 FL (ref 80–100)
MONOCYTES # BLD AUTO: 0.4 K/UL (ref 0–1)
MONOCYTES NFR BLD AUTO: 6 %
NEUTROPHILS # BLD AUTO: 4.3 K/UL (ref 1.3–7.7)
NEUTROPHILS NFR BLD AUTO: 65 %
PH UR: 6.5 [PH] (ref 5–8)
PLATELET # BLD AUTO: 249 K/UL (ref 150–450)
PROT UR QL: (no result)
PROT/CREAT UR-RTO: 0.53
RBC UR QL: <1 /HPF (ref 0–5)
SP GR UR: 1.02 (ref 1–1.03)
UROBILINOGEN UR QL STRIP: <2 MG/DL (ref ?–2)
WBC # BLD AUTO: 6.6 K/UL (ref 4–11)
WBC # UR AUTO: <1 /HPF (ref 0–5)

## 2020-12-02 PROCEDURE — 36415 COLL VENOUS BLD VENIPUNCTURE: CPT

## 2020-12-02 PROCEDURE — 81001 URINALYSIS AUTO W/SCOPE: CPT

## 2020-12-02 PROCEDURE — 82570 ASSAY OF URINE CREATININE: CPT

## 2020-12-02 PROCEDURE — 82306 VITAMIN D 25 HYDROXY: CPT

## 2020-12-02 PROCEDURE — 83550 IRON BINDING TEST: CPT

## 2020-12-02 PROCEDURE — 83735 ASSAY OF MAGNESIUM: CPT

## 2020-12-02 PROCEDURE — 83540 ASSAY OF IRON: CPT

## 2020-12-02 PROCEDURE — 82728 ASSAY OF FERRITIN: CPT

## 2020-12-02 PROCEDURE — 80053 COMPREHEN METABOLIC PANEL: CPT

## 2020-12-02 PROCEDURE — 85025 COMPLETE CBC W/AUTO DIFF WBC: CPT

## 2020-12-02 PROCEDURE — 84156 ASSAY OF PROTEIN URINE: CPT

## 2020-12-03 LAB
ALBUMIN SERPL-MCNC: 4.5 G/DL (ref 3.8–4.9)
ALBUMIN/GLOB SERPL: 2.5 G/DL (ref 1.6–3.17)
ALP SERPL-CCNC: 62 U/L (ref 41–126)
ALT SERPL-CCNC: 13 U/L (ref 10–49)
ANION GAP SERPL CALC-SCNC: 8.1 MMOL/L (ref 4–12)
AST SERPL-CCNC: 13 U/L (ref 14–35)
BUN SERPL-SCNC: 13 MG/DL (ref 9–27)
BUN/CREAT SERPL: 13 RATIO (ref 12–20)
CALCIUM SPEC-MCNC: 9.8 MG/DL (ref 8.7–10.3)
CHLORIDE SERPL-SCNC: 105 MMOL/L (ref 96–109)
CO2 SERPL-SCNC: 27.9 MMOL/L (ref 21.6–31.8)
FERRITIN SERPL-MCNC: 66.7 NG/ML (ref 22–322)
GLOBULIN SER CALC-MCNC: 1.8 G/DL (ref 1.6–3.3)
GLUCOSE SERPL-MCNC: 109 MG/DL (ref 70–110)
IRON SERPL-MCNC: 178 UG/DL (ref 65–175)
MAGNESIUM SPEC-SCNC: 2 MG/DL (ref 1.5–2.4)
POTASSIUM SERPL-SCNC: 4.5 MMOL/L (ref 3.5–5.5)
PROT SERPL-MCNC: 6.3 G/DL (ref 6.2–8.2)
SODIUM SERPL-SCNC: 141 MMOL/L (ref 135–145)
TIBC SERPL-MCNC: 289 UG/DL (ref 228–460)

## 2021-01-11 ENCOUNTER — HOSPITAL ENCOUNTER (OUTPATIENT)
Dept: HOSPITAL 47 - LABWHC1 | Age: 21
Discharge: HOME | End: 2021-01-11
Attending: NURSE PRACTITIONER
Payer: COMMERCIAL

## 2021-01-11 DIAGNOSIS — D64.9: ICD-10-CM

## 2021-01-11 DIAGNOSIS — E55.9: ICD-10-CM

## 2021-01-11 DIAGNOSIS — N39.0: ICD-10-CM

## 2021-01-11 DIAGNOSIS — R80.9: ICD-10-CM

## 2021-01-11 DIAGNOSIS — N05.0: Primary | ICD-10-CM

## 2021-01-11 LAB
ALBUMIN SERPL-MCNC: 4.6 G/DL (ref 3.8–4.9)
ALBUMIN/GLOB SERPL: 2.56 G/DL (ref 1.6–3.17)
ALP SERPL-CCNC: 54 U/L (ref 41–126)
ALT SERPL-CCNC: 14 U/L (ref 10–49)
ANION GAP SERPL CALC-SCNC: 5.5 MMOL/L (ref 4–12)
AST SERPL-CCNC: 13 U/L (ref 14–35)
BUN SERPL-SCNC: 14 MG/DL (ref 9–27)
BUN/CREAT SERPL: 15.56 RATIO (ref 12–20)
CALCIUM SPEC-MCNC: 9.3 MG/DL (ref 8.7–10.3)
CHLORIDE SERPL-SCNC: 109 MMOL/L (ref 96–109)
CO2 SERPL-SCNC: 26.5 MMOL/L (ref 21.6–31.8)
ERYTHROCYTE [DISTWIDTH] IN BLOOD BY AUTOMATED COUNT: 5.33 M/UL (ref 4.3–5.9)
ERYTHROCYTE [DISTWIDTH] IN BLOOD: 12.1 % (ref 11.5–15.5)
FERRITIN SERPL-MCNC: 66.1 NG/ML (ref 22–322)
GLOBULIN SER CALC-MCNC: 1.8 G/DL (ref 1.6–3.3)
GLUCOSE SERPL-MCNC: 104 MG/DL (ref 70–110)
HCT VFR BLD AUTO: 45.7 % (ref 39–53)
HGB BLD-MCNC: 15.4 GM/DL (ref 13–17.5)
IRON SERPL-MCNC: 132 UG/DL (ref 65–175)
MAGNESIUM SPEC-SCNC: 1.8 MG/DL (ref 1.5–2.4)
MCH RBC QN AUTO: 28.9 PG (ref 25–35)
MCHC RBC AUTO-ENTMCNC: 33.7 G/DL (ref 31–37)
MCV RBC AUTO: 85.8 FL (ref 80–100)
PH UR: 6 [PH] (ref 5–8)
PLATELET # BLD AUTO: 248 K/UL (ref 150–450)
POTASSIUM SERPL-SCNC: 4.3 MMOL/L (ref 3.5–5.5)
PROT SERPL-MCNC: 6.4 G/DL (ref 6.2–8.2)
PROT/CREAT UR-RTO: 0.03
SODIUM SERPL-SCNC: 141 MMOL/L (ref 135–145)
SP GR UR: 1.03 (ref 1–1.03)
TIBC SERPL-MCNC: 273 UG/DL (ref 228–460)
UROBILINOGEN UR QL STRIP: <2 MG/DL (ref ?–2)
WBC # BLD AUTO: 7.5 K/UL (ref 4–11)

## 2021-01-11 PROCEDURE — 85027 COMPLETE CBC AUTOMATED: CPT

## 2021-01-11 PROCEDURE — 82570 ASSAY OF URINE CREATININE: CPT

## 2021-01-11 PROCEDURE — 83735 ASSAY OF MAGNESIUM: CPT

## 2021-01-11 PROCEDURE — 83540 ASSAY OF IRON: CPT

## 2021-01-11 PROCEDURE — 82728 ASSAY OF FERRITIN: CPT

## 2021-01-11 PROCEDURE — 36415 COLL VENOUS BLD VENIPUNCTURE: CPT

## 2021-01-11 PROCEDURE — 82043 UR ALBUMIN QUANTITATIVE: CPT

## 2021-01-11 PROCEDURE — 81003 URINALYSIS AUTO W/O SCOPE: CPT

## 2021-01-11 PROCEDURE — 82306 VITAMIN D 25 HYDROXY: CPT

## 2021-01-11 PROCEDURE — 84156 ASSAY OF PROTEIN URINE: CPT

## 2021-01-11 PROCEDURE — 83550 IRON BINDING TEST: CPT

## 2021-01-11 PROCEDURE — 80053 COMPREHEN METABOLIC PANEL: CPT

## 2021-04-26 ENCOUNTER — HOSPITAL ENCOUNTER (OUTPATIENT)
Dept: HOSPITAL 47 - LABWHC1 | Age: 21
Discharge: HOME | End: 2021-04-26
Attending: NURSE PRACTITIONER
Payer: COMMERCIAL

## 2021-04-26 DIAGNOSIS — N39.0: ICD-10-CM

## 2021-04-26 DIAGNOSIS — R80.9: ICD-10-CM

## 2021-04-26 DIAGNOSIS — N25.81: ICD-10-CM

## 2021-04-26 DIAGNOSIS — D64.9: ICD-10-CM

## 2021-04-26 DIAGNOSIS — E55.9: ICD-10-CM

## 2021-04-26 DIAGNOSIS — N05.0: Primary | ICD-10-CM

## 2021-04-26 LAB
ALBUMIN SERPL-MCNC: 4.7 G/DL (ref 3.8–4.9)
ALBUMIN/GLOB SERPL: 2.47 G/DL (ref 1.6–3.17)
ALP SERPL-CCNC: 49 U/L (ref 41–126)
ALT SERPL-CCNC: 16 U/L (ref 10–49)
ANION GAP SERPL CALC-SCNC: 3.7 MMOL/L (ref 4–12)
AST SERPL-CCNC: 13 U/L (ref 14–35)
BUN SERPL-SCNC: 13 MG/DL (ref 9–27)
BUN/CREAT SERPL: 14.44 RATIO (ref 12–20)
CALCIUM SPEC-MCNC: 9.9 MG/DL (ref 8.7–10.3)
CHLORIDE SERPL-SCNC: 108 MMOL/L (ref 96–109)
CO2 SERPL-SCNC: 28.3 MMOL/L (ref 21.6–31.8)
ERYTHROCYTE [DISTWIDTH] IN BLOOD BY AUTOMATED COUNT: 5.56 X 10*6/UL (ref 4.4–5.6)
ERYTHROCYTE [DISTWIDTH] IN BLOOD: 12.7 % (ref 11.5–14.5)
FERRITIN SERPL-MCNC: 64.5 NG/ML (ref 22–322)
GLOBULIN SER CALC-MCNC: 1.9 G/DL (ref 1.6–3.3)
GLUCOSE SERPL-MCNC: 112 MG/DL (ref 70–110)
HCT VFR BLD AUTO: 48.2 % (ref 39.6–50)
HGB BLD-MCNC: 15.6 G/DL (ref 13–17)
IRON SERPL-MCNC: 227 UG/DL (ref 65–175)
MAGNESIUM SPEC-SCNC: 2 MG/DL (ref 1.5–2.4)
MCH RBC QN AUTO: 28.1 PG (ref 27–32)
MCHC RBC AUTO-ENTMCNC: 32.4 G/DL (ref 32–37)
MCV RBC AUTO: 86.7 FL (ref 80–97)
PH UR: 6.5 [PH] (ref 5–8)
PLATELET # BLD AUTO: 266 X 10*3/UL (ref 140–440)
POTASSIUM SERPL-SCNC: 4.3 MMOL/L (ref 3.5–5.5)
PROT SERPL-MCNC: 6.6 G/DL (ref 6.2–8.2)
PROT/CREAT UR-RTO: 0.04
SODIUM SERPL-SCNC: 140 MMOL/L (ref 135–145)
SP GR UR: 1.02 (ref 1–1.03)
TIBC SERPL-MCNC: 266 UG/DL (ref 228–460)
UROBILINOGEN UR QL STRIP: <2 MG/DL (ref ?–2)
WBC # BLD AUTO: 7.27 X 10*3/UL (ref 4.5–10)

## 2021-04-26 PROCEDURE — 83550 IRON BINDING TEST: CPT

## 2021-04-26 PROCEDURE — 83735 ASSAY OF MAGNESIUM: CPT

## 2021-04-26 PROCEDURE — 84156 ASSAY OF PROTEIN URINE: CPT

## 2021-04-26 PROCEDURE — 80053 COMPREHEN METABOLIC PANEL: CPT

## 2021-04-26 PROCEDURE — 82570 ASSAY OF URINE CREATININE: CPT

## 2021-04-26 PROCEDURE — 81003 URINALYSIS AUTO W/O SCOPE: CPT

## 2021-04-26 PROCEDURE — 85027 COMPLETE CBC AUTOMATED: CPT

## 2021-04-26 PROCEDURE — 81050 URINALYSIS VOLUME MEASURE: CPT

## 2021-04-26 PROCEDURE — 82728 ASSAY OF FERRITIN: CPT

## 2021-04-26 PROCEDURE — 82306 VITAMIN D 25 HYDROXY: CPT

## 2021-04-26 PROCEDURE — 83540 ASSAY OF IRON: CPT

## 2021-04-26 PROCEDURE — 36415 COLL VENOUS BLD VENIPUNCTURE: CPT

## 2021-04-26 PROCEDURE — 84100 ASSAY OF PHOSPHORUS: CPT

## 2021-04-27 LAB
PROT 24H UR-MRATE: 144 MG/24HR
SPECIMEN VOL 24H UR: 1200 ML

## 2021-06-14 ENCOUNTER — HOSPITAL ENCOUNTER (INPATIENT)
Dept: HOSPITAL 47 - EC | Age: 21
LOS: 3 days | Discharge: HOME | DRG: 699 | End: 2021-06-17
Attending: FAMILY MEDICINE | Admitting: FAMILY MEDICINE
Payer: COMMERCIAL

## 2021-06-14 DIAGNOSIS — J45.909: ICD-10-CM

## 2021-06-14 DIAGNOSIS — E87.70: ICD-10-CM

## 2021-06-14 DIAGNOSIS — K29.70: ICD-10-CM

## 2021-06-14 DIAGNOSIS — R60.0: ICD-10-CM

## 2021-06-14 DIAGNOSIS — E87.1: ICD-10-CM

## 2021-06-14 DIAGNOSIS — Z20.822: ICD-10-CM

## 2021-06-14 DIAGNOSIS — K59.00: ICD-10-CM

## 2021-06-14 DIAGNOSIS — N04.9: Primary | ICD-10-CM

## 2021-06-14 DIAGNOSIS — D75.1: ICD-10-CM

## 2021-06-14 DIAGNOSIS — K21.9: ICD-10-CM

## 2021-06-14 LAB
ALBUMIN SERPL-MCNC: 1.9 G/DL (ref 3.5–5)
ALP SERPL-CCNC: 113 U/L (ref 38–126)
ALT SERPL-CCNC: 12 U/L (ref 4–49)
ANION GAP SERPL CALC-SCNC: 2 MMOL/L
AST SERPL-CCNC: 25 U/L (ref 17–59)
BASOPHILS # BLD AUTO: 0 K/UL (ref 0–0.2)
BASOPHILS NFR BLD AUTO: 0 %
BUN SERPL-SCNC: 15 MG/DL (ref 9–20)
CALCIUM SPEC-MCNC: 7.7 MG/DL (ref 8.4–10.2)
CHLORIDE SERPL-SCNC: 100 MMOL/L (ref 98–107)
CO2 SERPL-SCNC: 25 MMOL/L (ref 22–30)
EOSINOPHIL # BLD AUTO: 0.2 K/UL (ref 0–0.7)
EOSINOPHIL NFR BLD AUTO: 2 %
ERYTHROCYTE [DISTWIDTH] IN BLOOD BY AUTOMATED COUNT: 6.77 M/UL (ref 4.3–5.9)
ERYTHROCYTE [DISTWIDTH] IN BLOOD: 12.3 % (ref 11.5–15.5)
GLUCOSE SERPL-MCNC: 107 MG/DL (ref 74–99)
HCT VFR BLD AUTO: 55.4 % (ref 39–53)
HGB BLD-MCNC: 19.2 GM/DL (ref 13–17.5)
HYALINE CASTS UR QL AUTO: 17 /LPF (ref 0–2)
LYMPHOCYTES # SPEC AUTO: 1.1 K/UL (ref 1–4.8)
LYMPHOCYTES NFR SPEC AUTO: 9 %
MAGNESIUM SPEC-SCNC: 1.8 MG/DL (ref 1.6–2.3)
MCH RBC QN AUTO: 28.4 PG (ref 25–35)
MCHC RBC AUTO-ENTMCNC: 34.7 G/DL (ref 31–37)
MCV RBC AUTO: 81.8 FL (ref 80–100)
MONOCYTES # BLD AUTO: 0.6 K/UL (ref 0–1)
MONOCYTES NFR BLD AUTO: 5 %
NEUTROPHILS # BLD AUTO: 9.7 K/UL (ref 1.3–7.7)
NEUTROPHILS NFR BLD AUTO: 83 %
PH UR: 6.5 [PH] (ref 5–8)
PLATELET # BLD AUTO: 360 K/UL (ref 150–450)
POTASSIUM SERPL-SCNC: 4 MMOL/L (ref 3.5–5.1)
PROT SERPL-MCNC: 4.5 G/DL (ref 6.3–8.2)
PROT UR QL: (no result)
RBC UR QL: 1 /HPF (ref 0–5)
SODIUM SERPL-SCNC: 127 MMOL/L (ref 137–145)
SP GR UR: >1.03 (ref 1–1.03)
SQUAMOUS UR QL AUTO: <1 /HPF (ref 0–4)
UROBILINOGEN UR QL STRIP: <2 MG/DL (ref ?–2)
WBC # BLD AUTO: 11.7 K/UL (ref 4–11)
WBC # UR AUTO: 3 /HPF (ref 0–5)

## 2021-06-14 PROCEDURE — 80053 COMPREHEN METABOLIC PANEL: CPT

## 2021-06-14 PROCEDURE — 87635 SARS-COV-2 COVID-19 AMP PRB: CPT

## 2021-06-14 PROCEDURE — 84156 ASSAY OF PROTEIN URINE: CPT

## 2021-06-14 PROCEDURE — 81001 URINALYSIS AUTO W/SCOPE: CPT

## 2021-06-14 PROCEDURE — 36415 COLL VENOUS BLD VENIPUNCTURE: CPT

## 2021-06-14 PROCEDURE — 83735 ASSAY OF MAGNESIUM: CPT

## 2021-06-14 PROCEDURE — 99284 EMERGENCY DEPT VISIT MOD MDM: CPT

## 2021-06-14 PROCEDURE — 80048 BASIC METABOLIC PNL TOTAL CA: CPT

## 2021-06-14 PROCEDURE — 81050 URINALYSIS VOLUME MEASURE: CPT

## 2021-06-14 PROCEDURE — 85025 COMPLETE CBC W/AUTO DIFF WBC: CPT

## 2021-06-14 PROCEDURE — 96374 THER/PROPH/DIAG INJ IV PUSH: CPT

## 2021-06-14 NOTE — ED
General Adult HPI





- General


Chief complaint: Recheck/Abnormal Lab/Rx


Stated complaint: Throat swelling up


Time Seen by Provider: 06/14/21 18:35


Source: patient, RN notes reviewed, old records reviewed


Mode of arrival: ambulatory


Limitations: no limitations





- History of Present Illness


Initial comments: 





This is a 20-year-old male with a past medical history significant for nephrotic

syndrome on and off for the last 5 years.  Patient reports emergency department 

today because he has noticed swelling of his legs arms and face and some in his 

throat area.  Patient states he's had no difficulty breathing but this all 

started yesterday and it got progressively worse per patient states typically 

the neck and face area get worse when he lays down.  Patient states they never 

determine why he had nephrotic syndrome and there has been no ear and it comes 

and goes occasionally.  Patient denies any recent fever chills or cough per 

patient denies any chest pain difficult breathing shortness of breath per 

patient denies any abdominal pain.  Patient denies any dysuria or hematuria or 

urinary frequency.





- Related Data


                                  Previous Rx's











 Medication  Instructions  Recorded


 


Furosemide [Lasix] 40 mg PO DAILY #30 tab 08/26/20


 


Pantoprazole [Protonix] 40 mg PO DAILY #30 tablet. 08/26/20


 


Potassium Chloride ER [K-Dur 10] 10 meq PO DAILY #30 tab.er.prt 08/26/20


 


lisinopriL [Zestril] 5 mg PO DAILY #30 tab 08/26/20


 


predniSONE [Deltasone] 60 mg PO DAILY #90 tab 08/26/20











                                    Allergies











Allergy/AdvReac Type Severity Reaction Status Date / Time


 


Penicillins Allergy  Unknown Verified 06/14/21 18:37














Review of Systems


ROS Statement: 


Those systems with pertinent positive or pertinent negative responses have been 

documented in the HPI.





ROS Other: All systems not noted in ROS Statement are negative.





Past Medical History


Past Medical History: Asthma, Renal Disease


Additional Past Medical History / Comment(s): nephrotic syndrome, asthma as a 

child


History of Any Multi-Drug Resistant Organisms: None Reported


Past Surgical History: No Surgical Hx Reported


Past Anesthesia/Blood Transfusion Reactions: Unable to Obtain


Additional Past Anesthesia/Blood Transfusion Reaction / Comment(s): Pt has never

 had surgery


Past Psychological History: No Psychological Hx Reported


Smoking Status: Never smoker


Past Alcohol Use History: None Reported


Past Drug Use History: None Reported





- Past Family History


  ** Mother


Additional Family Medical History / Comment(s): Mother has motion sickness.  Pt 

cannot recall any other hx.





  ** Father


History Unknown: Yes





General Exam





- General Exam Comments


Initial Comments: 





GENERAL:


Patient is well-developed and well-nourished.  Patient is nontoxic and well-

hydrated and is in mild distress.





ENT:


Neck is soft and supple.  No significant lymphadenopathy is noted.  Oropharynx 

is clear.  Moist mucous membranes.  Neck has full range of motion without 

eliciting any pain.





EYES:


The sclera were anicteric and conjunctiva were pink and moist.  Extraocular 

movements were intact and pupils were equal round and reactive to light.  

Eyelids were unremarkable.





PULMONARY:


Unlabored respirations.  Good breath sounds bilaterally.  No audible rales 

rhonchi or wheezing was noted.





CARDIOVASCULAR:


There is a regular rate and rhythm without any murmurs gallops or rubs.  





ABDOMEN:


Soft and nontender with normal bowel sounds.  No palpable organomegaly was 

noted.  There is no palpable pulsatile mass.





SKIN:


Skin is clear with no lesions or rashes and otherwise unremarkable.





NEUROLOGIC:


Patient is alert and oriented x3.  Cranial nerves II through XII are grossly 

intact.  Motor and sensory are also intact.  Normal speech, volume and content. 

 Symmetrical smile. 





MUSCULOSKELETAL:


Normal extremities with adequate strength and full range of motion.  Extremities

 appear to be swollen difficult to assess of the face is swollen secondary to 

not knowing his baseline.





LYMPHATICS:


No significant lymphadenopathy is noted





PSYCHIATRIC:


Normal psychiatric evaluation


Limitations: no limitations





Course


                                   Vital Signs











  06/14/21





  18:33


 


Temperature 98.0 F


 


Pulse Rate 114 H


 


Respiratory 22





Rate 


 


Blood Pressure 109/82


 


O2 Sat by Pulse 98





Oximetry 














Medical Decision Making





- Medical Decision Making





I spoke with Dr. Gutierrez she agreed the patient needed to be admitted we admitted 

the patient and start him on Lasix 20 mg every 8 hours





I spoke with Dr. Agosto she agreed to admit the patient admitted the patient 

wrote admitting orders.





- Lab Data


Result diagrams: 


                                 06/14/21 18:59





                                 06/14/21 18:59


                                   Lab Results











  06/14/21 06/14/21 Range/Units





  18:59 18:59 


 


WBC  11.7 H   (4.0-11.0)  k/uL


 


RBC  6.77 H   (4.30-5.90)  m/uL


 


Hgb  19.2 H*   (13.0-17.5)  gm/dL


 


Hct  55.4 H   (39.0-53.0)  %


 


MCV  81.8   (80.0-100.0)  fL


 


MCH  28.4   (25.0-35.0)  pg


 


MCHC  34.7   (31.0-37.0)  g/dL


 


RDW  12.3   (11.5-15.5)  %


 


Plt Count  360   (150-450)  k/uL


 


MPV  7.1   


 


Neutrophils %  83   %


 


Lymphocytes %  9   %


 


Monocytes %  5   %


 


Eosinophils %  2   %


 


Basophils %  0   %


 


Neutrophils #  9.7 H   (1.3-7.7)  k/uL


 


Lymphocytes #  1.1   (1.0-4.8)  k/uL


 


Monocytes #  0.6   (0-1.0)  k/uL


 


Eosinophils #  0.2   (0-0.7)  k/uL


 


Basophils #  0.0   (0-0.2)  k/uL


 


Sodium   127 L  (137-145)  mmol/L


 


Potassium   4.0  (3.5-5.1)  mmol/L


 


Chloride   100  ()  mmol/L


 


Carbon Dioxide   25  (22-30)  mmol/L


 


Anion Gap   2  mmol/L


 


BUN   15  (9-20)  mg/dL


 


Creatinine   0.97  (0.66-1.25)  mg/dL


 


Est GFR (CKD-EPI)AfAm   >90  (>60 ml/min/1.73 sqM)  


 


Est GFR (CKD-EPI)NonAf   >90  (>60 ml/min/1.73 sqM)  


 


Glucose   107 H  (74-99)  mg/dL


 


Calcium   7.7 L  (8.4-10.2)  mg/dL


 


Magnesium   1.8  (1.6-2.3)  mg/dL


 


Total Bilirubin   0.3  (0.2-1.3)  mg/dL


 


AST   25  (17-59)  U/L


 


ALT   12  (4-49)  U/L


 


Alkaline Phosphatase   113  ()  U/L


 


Total Protein   4.5 L  (6.3-8.2)  g/dL


 


Albumin   1.9 L  (3.5-5.0)  g/dL














Disposition


Clinical Impression: 


 Nephrotic syndrome





Disposition: ADMITTED AS IP TO THIS [facility]


Referrals: 


Truong Agosto DO [Primary Care Provider] - 1-2 days


Time of Disposition: 20:43

## 2021-06-15 LAB
GLUCOSE BLD-MCNC: 107 MG/DL (ref 75–99)
GLUCOSE BLD-MCNC: 111 MG/DL (ref 75–99)

## 2021-06-15 RX ADMIN — INSULIN ASPART SCH: 100 INJECTION, SOLUTION INTRAVENOUS; SUBCUTANEOUS at 17:45

## 2021-06-15 RX ADMIN — ONDANSETRON PRN MG: 2 INJECTION INTRAMUSCULAR; INTRAVENOUS at 22:41

## 2021-06-15 RX ADMIN — FUROSEMIDE SCH MG: 10 INJECTION, SOLUTION INTRAMUSCULAR; INTRAVENOUS at 18:07

## 2021-06-15 RX ADMIN — INSULIN ASPART SCH: 100 INJECTION, SOLUTION INTRAVENOUS; SUBCUTANEOUS at 21:12

## 2021-06-15 NOTE — P.HPIM
History of Present Illness


H&P Date: 06/15/21


Chief Complaint: Edema, concentrated


This a 20-year-old gentleman with history of nephrotic syndrome since his teen 

years, 2015, presented to the ER with complaints of edema, concentrated foamy 

urine, recognizing he was in acute exacerbation of his nephrotic syndrome.  Last

relapse 8/20.  Maintenance prednisone had been tapered off since about APril. 

Denies dysuria, no hematuria. Denies any chest pain, palpitations, shortness of 

breath.  Denies any fever or chills.  Denies any cough.  Denies nausea vomiting 

or diarrhea.  Denies abdominal pain.  Afebrile, mildly elevated WBC 11.7, 

hemoglobin 19.2, platelets 360, sodium 127, potassium 4 BUN 15 and 0.97, 

magnesium 1.8, albumin 1.9 UA occasional mucus, hyalin casts H, 3+ protein. 24 

hour urine in progress for protein.Receiving Lasix and prednisone as directed 

per nephrology with improvement in edema. 





Review of Systems





ROS Statement: 


Those systems with pertinent positive or pertinent negative responses have been 

documented in the HPI.





ROS Other: All systems not noted in ROS Statement are negative.








Past Medical History


Past Medical History: Asthma, Renal Disease


Additional Past Medical History / Comment(s): nephrotic syndrome, asthma as a 

child


History of Any Multi-Drug Resistant Organisms: None Reported


Past Surgical History: No Surgical Hx Reported


Past Anesthesia/Blood Transfusion Reactions: Unable to Obtain


Additional Past Anesthesia/Blood Transfusion Reaction / Comment(s): Pt has never

had surgery


Past Psychological History: No Psychological Hx Reported


Smoking Status: Never smoker


Past Alcohol Use History: None Reported


Past Drug Use History: None Reported





- Past Family History


  ** Mother


Additional Family Medical History / Comment(s): Mother has motion sickness.  Pt 

cannot recall any other hx.





  ** Father


History Unknown: Yes





Medications and Allergies


                                Home Medications











 Medication  Instructions  Recorded  Confirmed  Type


 


No Known Home Medications  06/14/21 06/14/21 History








                                    Allergies











Allergy/AdvReac Type Severity Reaction Status Date / Time


 


Penicillins Allergy  Unknown Verified 06/14/21 21:22














Physical Exam


Vitals: 


                                   Vital Signs











  Temp Pulse Pulse Resp BP BP Pulse Ox


 


 06/15/21 07:00  98.1 F   81  16   130/83  99


 


 06/14/21 18:33  98.0 F  114 H   22  109/82   98








                                Intake and Output











 06/14/21 06/15/21 06/15/21





 22:59 06:59 14:59


 


Output Total  225 


 


Balance  -225 


 


Output:   


 


  Urine  225 


 


Other:   


 


  Voiding Method Toilet Toilet 





  Urinal 


 


  # Voids  1 


 


  Weight 81.647 kg  














PHYSICAL EXAM:


VITAL SIGNS: As above


GENERAL: Sitting up in bed, no acute distress


HEENT:  Conjunctivae normal. eyes normal.  Mucosa moist.


NECK:  No JVD. No thyroid enlargement. No LNs


CARDIOVASCULAR:  S1, S2 regular. No murmur


RESPIRATION: Breath sounds diminished in the bases. No rhonchi or crackles. No 

bronchial breathing.


ABDOMEN:  Soft,  nontender . No guarding. no masses palpable. No ascites, No 

hepatosplenomegaly.Bowel sounds heard.


LEGS:  Bilateral lower extremity edema, denies calf pain.  Positive DP pulses.


PSYCHIATRY: Alert and oriented X3, mood and affect normal.


NERVOUS SYSTEM:  Cranial N 2-12 grossly normal. Moves all 4 limbs. No focal 

deficits.  Strength and sensation grossly intact..


Skin: Warm, dry, no rash 


Lymphatic system. No LN neck axilla.








Results


CBC & Chem 7: 


                                 06/14/21 18:59





                                 06/14/21 18:59


Labs: 


                  Abnormal Lab Results - Last 24 Hours (Table)











  06/14/21 06/14/21 06/14/21 Range/Units





  18:59 18:59 18:59 


 


WBC  11.7 H    (4.0-11.0)  k/uL


 


RBC  6.77 H    (4.30-5.90)  m/uL


 


Hgb  19.2 H*    (13.0-17.5)  gm/dL


 


Hct  55.4 H    (39.0-53.0)  %


 


Neutrophils #  9.7 H    (1.3-7.7)  k/uL


 


Sodium   127 L   (137-145)  mmol/L


 


Glucose   107 H   (74-99)  mg/dL


 


Calcium   7.7 L   (8.4-10.2)  mg/dL


 


Total Protein   4.5 L   (6.3-8.2)  g/dL


 


Albumin   1.9 L   (3.5-5.0)  g/dL


 


Hyaline Casts    17 H  (0-2)  /lpf


 


Urine Mucus    Occasional H  (None)  /hpf














Assessment and Plan


Assessment: 





Nephrotic syndrome, in a patient with last relapse 8/20, tapered off of 

maintenance prednisone.  


Proteinuria secondary to the above


Bilateral lower extremity edema secondary to the above


Hyponatremia, hypervolemic secondary to nephrotic syndrome


History of childhood asthma, stable























Plan: Continue on current medication regime ,monitoring and symptomatic treatmen

t.  Diuretics/steroids as per nephrology.  PPI for GI prophylaxis.











The impression and plan of care has been dictated as directed.





:


I performed a history and examination of this patient,  discussed the same with 

the dictator.  I agree with the dictator's note ,documented as a scribe.  Any 

additional findings or plans will be noted.

## 2021-06-15 NOTE — PN
PROGRESS NOTE



_____ initially diagnosed in about 2015 and since then patient has had at least 5-6

relapses.  He was initially being followed by a pediatric nephrologist in [place].  He

has been following with us since August of 2020.  The patient had his most recent

relapse in August of 2020.  He had repeat renal biopsy done at that time showed which

showed minimal change disease.  He responded very well to prednisone and this was

subsequently being tapered off since April and May of this year.  The patient was

admitted to the hospital with complaints of increased swelling in his lower

extremities.  Increased puffiness and foamy urine.  We have a 24 hour urine protein of

144 mg done during his last visit in April of 2021.  Currently his UA shows 3+ protein.

Albumin is low at 1.9.  Serum creatinine about 0.9 mg/dL.



PAST MEDICAL HISTORY:

Minimal change disease, history of gastroesophageal reflux disease, asthma.



PAST SURGICAL HISTORY:

None except for kidney biopsies.



SOCIAL HISTORY:

Negative for smoking, drug abuse or alcohol abuse.



MEDICATIONS:

Medications at home prior to admission include: Lasix, Protonix, potassium, Zestril,

Deltasone.



ALLERGIES:

ALLERGIES include PENICILLIN.



REVIEW OF SYSTEMS:

As per HPI.  Other systems negative.



EXAMINATION:

Currently comfortable, awake, not in any acute distress.  Alert, oriented x3.

Blood pressure 130/83, heart rate 81 per minute.  He is afebrile.

Examination of the heart S1, S2.

Examination of the lungs, bilateral breath sounds are heard.

Abdomen is soft, nontender.

Examination of lower extremities shows edema 1+ bilaterally.

CNS exam grossly intact.



LAB:

Show hemoglobin 19.2, sodium 137, potassium 4.0, serum creatinine 0.97, serum albumin

1.9.  UA shows 3+ protein.  Moderate blood is seen.



ASSESSMENT:

1. Minimal change disease with relapse with previous history of multiple relapses

    initially diagnosed in 2015.  Last relapse was in August of 2020, status post

    treatment with prednisone.  The patient had a kidney biopsy done in August of 2020

    as well.  I will resume the prednisone at 60 mg.  Patient will also need a steroid

    sparing treatment _____ with Prograf and he will likely need to stay on it to

    continue to maintain remission. A 24 hour urine for protein has been ordered

    already.  The patient will be started on aspirin as well.

2. Hyponatremia associated with nephrotic syndrome and currently hypervolemic, started

    on Lasix.



PLAN:

Continue with Lasix.  Repeat labs in a.m.  Start prednisone and patient will likely

need Prograf versus Rituxan down the road to maintain remission.



Thank you for this consultation.  We will continue to follow the patient with you

during his hospitalization.





MMODL / IJN: [id number] / Job#: [id number]

## 2021-06-16 LAB
ACANTHOCYTES BLD QL SMEAR: (no result)
ANION GAP SERPL CALC-SCNC: 19 MMOL/L (ref 4–12)
BASOPHILS # BLD AUTO: 0.03 X 10*3/UL (ref 0–0.1)
BASOPHILS NFR BLD AUTO: 0.3 %
BUN SERPL-SCNC: 23 MG/DL (ref 9–27)
BUN/CREAT SERPL: 23 RATIO (ref 12–20)
CALCIUM SPEC-MCNC: 7.1 MG/DL (ref 8.7–10.3)
CHLORIDE SERPL-SCNC: 94 MMOL/L (ref 96–109)
CO2 SERPL-SCNC: 17 MMOL/L (ref 21.6–31.8)
EOSINOPHIL # BLD AUTO: 0.01 X 10*3/UL (ref 0.04–0.35)
EOSINOPHIL NFR BLD AUTO: 0.1 %
ERYTHROCYTE [DISTWIDTH] IN BLOOD BY AUTOMATED COUNT: 7.27 X 10*6/UL (ref 4.4–5.6)
ERYTHROCYTE [DISTWIDTH] IN BLOOD: 13 % (ref 11.5–14.5)
GLUCOSE BLD-MCNC: 102 MG/DL (ref 75–99)
GLUCOSE BLD-MCNC: 104 MG/DL (ref 75–99)
GLUCOSE BLD-MCNC: 113 MG/DL (ref 75–99)
GLUCOSE BLD-MCNC: 134 MG/DL (ref 75–99)
GLUCOSE SERPL-MCNC: 119 MG/DL (ref 70–110)
HCT VFR BLD AUTO: 60.5 % (ref 39.6–50)
HGB BLD-MCNC: 20.4 G/DL (ref 13–17)
LYMPHOCYTES # SPEC AUTO: 0.81 X 10*3/UL (ref 0.9–5)
LYMPHOCYTES NFR SPEC AUTO: 7.7 %
MCH RBC QN AUTO: 28.1 PG (ref 27–32)
MCHC RBC AUTO-ENTMCNC: 33.7 G/DL (ref 32–37)
MCV RBC AUTO: 83.2 FL (ref 80–97)
MONOCYTES # BLD AUTO: 0.28 X 10*3/UL (ref 0.2–1)
MONOCYTES NFR BLD AUTO: 2.7 %
NEUTROPHILS # BLD AUTO: 9.33 X 10*3/UL (ref 1.8–7.7)
NEUTROPHILS NFR BLD AUTO: 88.3 %
PLATELET # BLD AUTO: 433 X 10*3/UL (ref 140–440)
POTASSIUM SERPL-SCNC: 5.3 MMOL/L (ref 3.5–5.5)
SODIUM SERPL-SCNC: 130 MMOL/L (ref 135–145)
SPECIMEN VOL 24H UR: 1050 ML
WBC # BLD AUTO: 10.56 X 10*3/UL (ref 4.5–10)

## 2021-06-16 RX ADMIN — FUROSEMIDE SCH MG: 10 INJECTION, SOLUTION INTRAMUSCULAR; INTRAVENOUS at 05:38

## 2021-06-16 RX ADMIN — INSULIN ASPART SCH: 100 INJECTION, SOLUTION INTRAVENOUS; SUBCUTANEOUS at 07:24

## 2021-06-16 RX ADMIN — PANTOPRAZOLE SODIUM SCH MG: 40 INJECTION, POWDER, FOR SOLUTION INTRAVENOUS at 12:49

## 2021-06-16 RX ADMIN — INSULIN ASPART SCH: 100 INJECTION, SOLUTION INTRAVENOUS; SUBCUTANEOUS at 17:49

## 2021-06-16 RX ADMIN — FUROSEMIDE SCH MG: 10 INJECTION, SOLUTION INTRAMUSCULAR; INTRAVENOUS at 18:05

## 2021-06-16 RX ADMIN — INSULIN ASPART SCH: 100 INJECTION, SOLUTION INTRAVENOUS; SUBCUTANEOUS at 21:09

## 2021-06-16 RX ADMIN — ONDANSETRON PRN MG: 2 INJECTION INTRAMUSCULAR; INTRAVENOUS at 07:16

## 2021-06-16 RX ADMIN — ONDANSETRON PRN MG: 2 INJECTION INTRAMUSCULAR; INTRAVENOUS at 21:17

## 2021-06-16 RX ADMIN — INSULIN ASPART SCH: 100 INJECTION, SOLUTION INTRAVENOUS; SUBCUTANEOUS at 12:34

## 2021-06-16 NOTE — P.PN
Subjective


Progress Note Date: 06/16/21





This a 20-year-old gentleman with history of nephrotic syndrome since his teen 

years, 2015, presented to the ER with complaints of edema, concentrated foamy 

urine, recognizing he was in acute exacerbation of his nephrotic syndrome.  Last

relapse 8/20.  Maintenance prednisone had been tapered off since about APril. 

Denies dysuria, no hematuria. Denies any chest pain, palpitations, shortness of 

breath.  Denies any fever or chills.  Denies any cough.  Denies nausea vomiting 

or diarrhea.  Denies abdominal pain.  Afebrile, mildly elevated WBC 11.7, 

hemoglobin 19.2, platelets 360, sodium 127, potassium 4 BUN 15 and 0.97, 

magnesium 1.8, albumin 1.9 UA occasional mucus, hyalin casts H, 3+ protein. 24 

hour urine in progress for protein.Receiving Lasix and prednisone as directed 

per nephrology with improvement in edema. 








06/16/2021 nauseated last night, reports feels "puffy".  He felt like his 

possibly retaining urine Continues on steroids and diuretics as per nephrology. 

24 hour urine completed, results pending.  Labs pending.





Objective





- Vital Signs


Vital signs: 


                                   Vital Signs











Temp  97.9 F   06/16/21 06:49


 


Pulse  117 H  06/16/21 06:49


 


Resp  18   06/16/21 14:00


 


BP  136/79   06/16/21 06:49


 


Pulse Ox  98   06/16/21 06:49








                                 Intake & Output











 06/15/21 06/16/21 06/16/21





 18:59 06:59 18:59


 


Output Total 300 400 


 


Balance -300 -400 


 


Weight 81.647 kg  


 


Output:   


 


  Urine 300 400 


 


Other:   


 


  Voiding Method  Toilet 





  Urinal 


 


  # Voids 1 1 3














- Exam


PHYSICAL EXAM:


VITAL SIGNS: As above


GENERAL: Sitting up in bed, no acute distress


HEENT:  Conjunctivae normal. eyes normal.  Mucosa moist.


NECK:  No JVD. No thyroid enlargement. No LNs


CARDIOVASCULAR:  S1, S2 regular. No murmur


RESPIRATION: Breath sounds diminished in the bases. No rhonchi or crackles. No 

wheezing.


ABDOMEN: Soft,nontender.No guarding.no masses palpable. No ascites.Bowel sounds 

heard.


LEGS:  Bilateral lower extremity edema, improving, denies calf pain. Positive DP

pulses.


PSYCHIATRY: Alert and oriented X3, mood and affect normal.


NERVOUS SYSTEM: Cranial N 2-12 grossly normal. Moves all 4 limbs. No focal 

deficits. Strength and sensation grossly intact.


Skin: Warm, dry, no rash.














- Labs


CBC & Chem 7: 


                                 06/16/21 04:36





                                 06/14/21 18:59


Labs: 


                  Abnormal Lab Results - Last 24 Hours (Table)











  06/15/21 06/15/21 06/16/21 Range/Units





  17:27 20:41 04:36 


 


WBC    10.56 H  (4.50-10.00)  X 10*3/uL


 


RBC    7.27 H  (4.40-5.60)  X 10*6/uL


 


Hgb    20.4 H*  (13.0-17.0)  g/dL


 


Hct    60.5 H*  (39.6-50.0)  %


 


Immature Gran #    0.10 H  (0.00-0.04)  X 10*3/uL


 


Neutrophils #    9.33 H  (1.80-7.70)  X 10*3/uL


 


Lymphocytes #    0.81 L  (0.90-5.00)  X 10*3/uL


 


Eosinophils #    0.01 L  (0.04-0.35)  X 10*3/uL


 


POC Glucose (mg/dL)  111 H  107 H   (75-99)  mg/dL














  06/16/21 06/16/21 Range/Units





  06:51 12:05 


 


WBC    (4.50-10.00)  X 10*3/uL


 


RBC    (4.40-5.60)  X 10*6/uL


 


Hgb    (13.0-17.0)  g/dL


 


Hct    (39.6-50.0)  %


 


Immature Gran #    (0.00-0.04)  X 10*3/uL


 


Neutrophils #    (1.80-7.70)  X 10*3/uL


 


Lymphocytes #    (0.90-5.00)  X 10*3/uL


 


Eosinophils #    (0.04-0.35)  X 10*3/uL


 


POC Glucose (mg/dL)  134 H  113 H  (75-99)  mg/dL














Assessment and Plan


Assessment: 





Nephrotic syndrome, in a patient with last relapse 8/20, tapered off of 

maintenance prednisone.  


Proteinuria secondary to the above


Bilateral lower extremity edema secondary to the above


Hyponatremia, hypervolemic secondary to nephrotic syndrome


History of childhood asthma, stable























Plan: Continue on current medication regime ,monitoring and symptomatic 

treatment. Labs pending. 24-hour urine results pending .Diuretics/steroids as 

per nephrology. PPI.











The impression and plan of care has been dictated as directed.





:


I performed a history and examination of this patient,  discussed the same with 

the dictator.  I agree with the dictator's note ,documented as a scribe.  Any 

additional findings or plans will be noted.

## 2021-06-16 NOTE — PN
PROGRESS NOTE



Patient is seen for followup for proteinuria.  The patient has underlying minimal

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  He was recently taken off steroids.  Kidney biopsy

was done in August of 2020.  The patient was admitted to the hospital with complaints

of edema.  His UA had shown 3+ protein, 24 hour urine has been ordered, currently

pending.



Patient was started on prednisone again.  However, he is complaining of significant

nausea.  I have started him on the Protonix as well.  However if his nausea does not

improve, he may need EGD for possible underlying gastritis secondary to the steroids.



PHYSICAL EXAMINATION:

On examination today, blood pressure 136/79, heart rate 117 per minute. Patient is

afebrile. Examination of the heart S1, S2.

Examination of the lungs, bilateral breath sounds are heard.

Abdomen is soft, nontender.

Examination of lower extremities shows no edema.

CNS exam grossly intact.



LAB:

Show hemoglobin 20.4, white cell count is 10.5, BMP is pending.  Creatinine was 0.97 on

06/14/2021.



ASSESSMENT:

1. Minimal change disease with relapse status post recent treatment with prednisone

    after relapse in August of 2020.  Currently back on prednisone.  Consider adding

    Prograf/Rituxan as outpatient to maintain remission.

2. Polycythemia.

3. Nausea possibly related to underlying gastritis from steroids.  The patient is

    maintained on Protonix.  We can continue with Zofran.

4. Hyponatremia, mostly hypervolemic, started on Lasix.  Labs are pending from today.



PLAN:

Continue with Lasix.  Add oral Reglan x1.  Continue with Zofran.  Change Protonix to

IV.  Check labs today.  Continue with prednisone for now.  Awaiting 24 hour urine

results.





MMODL / IJN: [id number] / Job#: [id number]

## 2021-06-17 VITALS
HEART RATE: 86 BPM | SYSTOLIC BLOOD PRESSURE: 138 MMHG | RESPIRATION RATE: 17 BRPM | TEMPERATURE: 97.8 F | DIASTOLIC BLOOD PRESSURE: 82 MMHG

## 2021-06-17 LAB
GLUCOSE BLD-MCNC: 101 MG/DL (ref 75–99)
GLUCOSE BLD-MCNC: 110 MG/DL (ref 75–99)

## 2021-06-17 RX ADMIN — INSULIN ASPART SCH: 100 INJECTION, SOLUTION INTRAVENOUS; SUBCUTANEOUS at 08:52

## 2021-06-17 RX ADMIN — ONDANSETRON PRN MG: 2 INJECTION INTRAMUSCULAR; INTRAVENOUS at 09:09

## 2021-06-17 RX ADMIN — FUROSEMIDE SCH MG: 10 INJECTION, SOLUTION INTRAMUSCULAR; INTRAVENOUS at 06:09

## 2021-06-17 RX ADMIN — INSULIN ASPART SCH: 100 INJECTION, SOLUTION INTRAVENOUS; SUBCUTANEOUS at 13:15

## 2021-06-17 RX ADMIN — PANTOPRAZOLE SODIUM SCH MG: 40 INJECTION, POWDER, FOR SOLUTION INTRAVENOUS at 09:09

## 2021-06-17 NOTE — P.PN
Subjective


Progress Note Date: 06/17/21





This a 20-year-old gentleman with history of nephrotic syndrome since his teen 

years, 2015, presented to the ER with complaints of edema, concentrated foamy 

urine, recognizing he was in acute exacerbation of his nephrotic syndrome.  Last

relapse 8/20.  Maintenance prednisone had been tapered off since about APril. 

Denies dysuria, no hematuria. Denies any chest pain, palpitations, shortness of 

breath.  Denies any fever or chills.  Denies any cough.  Denies nausea vomiting 

or diarrhea.  Denies abdominal pain.  Afebrile, mildly elevated WBC 11.7, 

hemoglobin 19.2, platelets 360, sodium 127, potassium 4 BUN 15 and 0.97, 

magnesium 1.8, albumin 1.9 UA occasional mucus, hyalin casts H, 3+ protein. 24 

hour urine in progress for protein.Receiving Lasix and prednisone as directed 

per nephrology with improvement in edema. 








06/16/2021 nauseated last night, reports feels "puffy".  He felt like his 

possibly retaining urine Continues on steroids and diuretics as per nephrology. 

24 hour urine completed, results pending.  Labs pending.








06/17/2021initially planned for discharge today but minimal diet intake, 

persistent nausea with dry heaving persists.  Mild constipation.  Maintained on 

Zofran, Reglan, PPI.  24-hour urine/protein results pending.  Afebrile.





Objective





- Vital Signs


Vital signs: 


                                   Vital Signs











Temp  97.8 F   06/17/21 07:00


 


Pulse  86   06/17/21 07:00


 


Resp  17   06/17/21 07:00


 


BP  138/82   06/17/21 07:00


 


Pulse Ox  97   06/17/21 07:00








                                 Intake & Output











 06/16/21 06/17/21 06/17/21





 18:59 06:59 18:59


 


Other:   


 


  Voiding Method  Toilet 





  Urinal 


 


  # Voids 7 3 


 


  # Bowel Movements 0  














- Exam


PHYSICAL EXAM:


VITAL SIGNS: As above


GENERAL: Standing up, no acute distress


HEENT:  Conjunctivae normal. eyes normal.  Mucosa moist.


NECK:  No JVD. No thyroid enlargement. No LNs


CARDIOVASCULAR:  S1, S2 regular. No murmur


RESPIRATION: Breath sounds diminished in the bases. No rhonchi or crackles. No 

wheezing.


ABDOMEN: Soft,nontender.No guarding.no masses palpable. No ascites.Bowel sounds 

heard.


LEGS:  Bilateral lower extremity edema, improving, denies calf pain. Positive DP

pulses.


PSYCHIATRY: Alert and oriented X3, mood and affect normal.


NERVOUS SYSTEM: Cranial N 2-12 grossly normal. Moves all 4 limbs. No focal 

deficits. 


Skin: Warm, dry, no rash.














- Labs


CBC & Chem 7: 


                                 06/16/21 04:36





                                 06/16/21 04:36


Labs: 


                  Abnormal Lab Results - Last 24 Hours (Table)











  06/16/21 06/16/21 06/16/21 Range/Units





  04:36 17:30 21:08 


 


Sodium  130 L    (135-145)  mmol/L


 


Chloride  94 L    ()  mmol/L


 


Carbon Dioxide  17.0 L    (21.6-31.8)  mmol/L


 


Anion Gap  19.00 H    (4.00-12.00)  mmol/L


 


BUN/Creatinine Ratio  23.00 H    (12.00-20.00)  Ratio


 


Glucose  119 H    ()  mg/dL


 


POC Glucose (mg/dL)   102 H  104 H  (75-99)  mg/dL


 


Calcium  7.1 L    (8.7-10.3)  mg/dL














  06/17/21 06/17/21 Range/Units





  07:38 12:14 


 


Sodium    (135-145)  mmol/L


 


Chloride    ()  mmol/L


 


Carbon Dioxide    (21.6-31.8)  mmol/L


 


Anion Gap    (4.00-12.00)  mmol/L


 


BUN/Creatinine Ratio    (12.00-20.00)  Ratio


 


Glucose    ()  mg/dL


 


POC Glucose (mg/dL)  110 H  101 H  (75-99)  mg/dL


 


Calcium    (8.7-10.3)  mg/dL














Assessment and Plan


Assessment: 





Nephrotic syndrome, in a patient with last relapse 8/20, tapered off of 

maintenance prednisone.  


Proteinuria secondary to the above


Bilateral lower extremity edema secondary to the above


Hyponatremia, hypervolemic secondary to nephrotic syndrome


History of childhood asthma, stable























Plan: Continue on current medication regime ,monitoring and symptomatic 

treatment. 24-hour urine results pending .Nephrology requesting GI consult, 

related to ongoing nausea as patient will need to be discharged on 

steroids.Diuretics/steroids as per nephrology. PPI.  Discharge planning in 

progress for tomorrow











The impression and plan of care has been dictated as directed.





.:


I performed a history and examination of this patient,  discussed the same with 

the dictator.  I agree with the dictator's note ,documented as a scribe.  Any 

additional findings or plans will be noted.

## 2021-06-17 NOTE — P.DS
Providers


Date of admission: 


06/16/21 08:48





Expected date of discharge: 06/17/21


Attending physician: 


Truong Agosto





Consults: 





                                        





06/14/21 20:43


Consult Physician Urgent 


   Consulting Provider: Anahi Gutierrez


   Consult Reason/Comments: Nephrotic syndrome


   Do you want consulting provider notified?: Yes, Notify in am





06/17/21 11:09


Consult Physician Routine 


   Consulting Provider: Tamica Mcgrath


   Consult Reason/Comments: intractable nausea/dry heaving


   Do you want consulting provider notified?: Yes











Primary care physician: 


Truong Agosto





Hospital Course: 


Final Diagnoses:


Nephrotic syndrome, in a patient with last relapse 8/20, tapered off of 

maintenance prednisone.  


Proteinuria secondary to the above


Bilateral lower extremity edema secondary to the above


Hyponatremia, hypervolemic secondary to nephrotic syndrome


History of childhood asthma, stable








Hospital course:This a 20-year-old gentleman with history of nephrotic syndrome 

since his teen years, 2015, presented to the ER with complaints of edema, 

concentrated foamy urine, recognizing he was in acute exacerbation of his 

nephrotic syndrome.  Last relapse 8/20.  Maintenance prednisone had been tapered

off since about APril. Denies dysuria, no hematuria. Denies any chest pain, 

palpitations, shortness of breath.  Denies any fever or chills.  Denies any 

cough.  Denies nausea vomiting or diarrhea.  Denies abdominal pain.  Afebrile, 

mildly elevated WBC 11.7, hemoglobin 19.2, platelets 360, sodium 127, potassium 

4 BUN 15 and 0.97, magnesium 1.8, albumin 1.9 UA occasional mucus, hyalin casts 

H, 3+ protein. 24 hour urine in progress for protein.Receiving Lasix and 

prednisone as directed per nephrology with improvement in edema. 








06/16/2021 nauseated last night, reports feels "puffy".  He felt like his 

possibly retaining urine Continues on steroids and diuretics as per nephrology. 

24 hour urine completed, results pending.  Labs pending.








06/17/2021initially planned for discharge today but minimal diet intake, 

persistent nausea with dry heaving persists.  Mild constipation.  Maintained on 

Zofran, Reglan, PPI.  24-hour urine/protein results pending.  Afebrile.








Significant clinical improvement by lunch time, patient had consumed pizza with 

no further nausea.  Evaluated by GI with no further recommendations and cleared 

for discharge.  Nephrology recommended continuing steroids at 60 mg daily and 

follow-up in one week.  Patient will be discharged home today in stable 

condition with guarded prognosis. 








The impression and plan of care has been dictated as directed.





:


I performed a history and examination of this patient,  discussed the same with 

the dictator.  I agree with the dictator's note ,documented as a scribe.  Any 

additional findings or plans will be noted.




















Patient Condition at Discharge: Stable





Plan - Discharge Summary


Discharge Rx Participant: No


New Discharge Prescriptions: 


New


   Ondansetron Odt [Zofran Odt] 4 mg PO Q8HR PRN #21 tab


     PRN Reason: Nausea


   predniSONE [Deltasone] 60 mg PO DAILY #42 tab


   Pantoprazole Sodium [Protonix] 40 mg PO DAILY #30 tablet.


Discharge Medication List





Ondansetron Odt [Zofran Odt] 4 mg PO Q8HR PRN #21 tab 06/17/21 [Rx]


Pantoprazole Sodium [Protonix] 40 mg PO DAILY #30 tablet. 06/17/21 [Rx]


predniSONE [Deltasone] 60 mg PO DAILY #42 tab 06/17/21 [Rx]








Follow up Appointment(s)/Referral(s): 


Anahi Gutierrez MD [STAFF PHYSICIAN] - 1 Week


Truong Agosto DO [Primary Care Provider] - 3 Days


Activity/Diet/Wound Care/Special Instructions: 


Off work for the rest of this week.

## 2021-06-17 NOTE — P.CONS
History of Present Illness





- Reason for Consult


Consult date: 06/17/21


Nausea and vomiting


Requesting physician: Truong Agosto





- Chief Complaint


Edema





- History of Present Illness





This is a pleasant 20-year-old white male who presented to the emergency 

department 3 days ago with complaints of increased edema and foamy urine which 

he correlated with previous acute exacerbation of nephrotic syndrome.  He has a 

past medical history including nephrotic syndrome since he was a teenager, 

diagnosed 2015 and follows with nephrology.  He eats he has been on prednisone, 

but ran out the last 2 weeks and did not get it refilled.  He was started on pr

ednisone this admission, stated he has been having nausea and vomiting for the 

last 3-4 days duration.  He states he has not had this before with his nephrotic

syndrome and thinks it is different.  However he states he is feeling better 

today and not having any vomiting, had a little dry heaves this morning was able

to see a good lunch with pizza.  Has not had any vomiting since.  He denies any 

abdominal pain or diarrhea.  States he is last bowel movement was Monday night.





Recent blood work shows WBC 10.56, hemoglobin 20.4, hematocrit 60.5, platelet 

count 433,000, total bilirubin 0.3, alkaline phosphatase 113, AST 25, ALT 12.





Review of Systems





REVIEW OF SYSTEMS:


CARDIOPULMONARY: No chest pain or shortness of breath.  


Gastrointestinal: No abdominal pain.   Positive nausea and vomiting.  No 

hematemesis, coffee-ground emesis.  No rectal bleeding, or melena.


GENITOURINARY:  No dysuria or hematuria. 


MUSCULOSKELETAL: Reports normal range of motion.,  Joint pain.


SKIN: No rashes.  No jaundice.


ENDOCRINE: No chills, fevers.  No excessive weight gain or loss.  No polydipsia 

or polyuria.  Edema, history of nephrotic syndrome.


PSYCHIATRIC: Unremarkable. 


NEUROLOGY: No change in mental status.  Denies dizziness, headache.


ENT: Vision unremarkable. 


CONSTITUTIONAL: No recent weight loss. No fever, chills, night sweats. 





Past Medical History


Past Medical History: Asthma, Renal Disease


Additional Past Medical History / Comment(s): nephrotic syndrome, asthma as a ch

ild


History of Any Multi-Drug Resistant Organisms: None Reported


Past Surgical History: No Surgical Hx Reported


Additional Past Surgical History / Comment(s): L renal parenchymal biopsy


Past Anesthesia/Blood Transfusion Reactions: Unable to Obtain


Additional Past Anesthesia/Blood Transfusion Reaction / Comm: Pt has never had 

surgery


Past Psychological History: No Psychological Hx Reported


Smoking Status: Never smoker


Past Alcohol Use History: None Reported


Past Drug Use History: None Reported





- Past Family History


  ** Mother


Additional Family Medical History / Comment(s): Mother has motion sickness.  Pt 

cannot recall any other hx.





  ** Father


History Unknown: Yes





Medications and Allergies


                                Home Medications











 Medication  Instructions  Recorded  Confirmed  Type


 


Ondansetron Odt [Zofran Odt] 4 mg PO Q8HR PRN #21 tab 06/17/21  Rx


 


Pantoprazole Sodium [Protonix] 40 mg PO DAILY #30 tablet. 06/17/21  Rx


 


predniSONE [Deltasone] 60 mg PO DAILY #42 tab 06/17/21  Rx








                                    Allergies











Allergy/AdvReac Type Severity Reaction Status Date / Time


 


Penicillins Allergy  Unknown Verified 06/14/21 21:22














Physical Exam


Vitals: 


                                   Vital Signs











  Temp Pulse Resp BP Pulse Ox


 


 06/17/21 07:00  97.8 F  86  17  138/82  97


 


 06/17/21 02:10  97.7 F  102 H  18  148/76  98


 


 06/16/21 21:24  97.9 F  93  14  142/83  98


 


 06/16/21 14:00    18  


 


 06/16/21 13:58  98.0 F  105 H  16  129/81  97








                                Intake and Output











 06/16/21 06/17/21 06/17/21





 22:59 06:59 14:59


 


Other:   


 


  Voiding Method Toilet  





 Urinal  


 


  # Voids 2 3 














General appearance: The patient is alert, oriented, appears in no acute 

distress.


HET: Head is normocephalic and atraumatic.  Conjunctiva pink.  Sclera anicteric.


Neck: Supple without lymphadenopathy.  Trachea midline.


Heart: S1 S2.  Regular rate and rhythm.


Lungs: Clear to auscultation.


Abdomen: Soft, nontender, nondistended with  bowel sounds.  No guarding or 

rigidity.


Skin: No rashes.  No jaundice.


Extremities: Normal skin color and turgor.  Bilateral pedal edema.


Neurological: No focal deficits.  Alert and oriented.





Results


CBC & Chem 7: 


                                 06/16/21 04:36





                                 06/16/21 04:36


Labs: 


                  Abnormal Lab Results - Last 24 Hours (Table)











  06/16/21 06/16/21 06/16/21 Range/Units





  04:36 17:30 21:08 


 


Sodium  130 L    (135-145)  mmol/L


 


Chloride  94 L    ()  mmol/L


 


Carbon Dioxide  17.0 L    (21.6-31.8)  mmol/L


 


Anion Gap  19.00 H    (4.00-12.00)  mmol/L


 


BUN/Creatinine Ratio  23.00 H    (12.00-20.00)  Ratio


 


Glucose  119 H    ()  mg/dL


 


POC Glucose (mg/dL)   102 H  104 H  (75-99)  mg/dL


 


Calcium  7.1 L    (8.7-10.3)  mg/dL














  06/17/21 Range/Units





  07:38 


 


Sodium   (135-145)  mmol/L


 


Chloride   ()  mmol/L


 


Carbon Dioxide   (21.6-31.8)  mmol/L


 


Anion Gap   (4.00-12.00)  mmol/L


 


BUN/Creatinine Ratio   (12.00-20.00)  Ratio


 


Glucose   ()  mg/dL


 


POC Glucose (mg/dL)  110 H  (75-99)  mg/dL


 


Calcium   (8.7-10.3)  mg/dL














Assessment and Plan


(1) Nausea and vomiting


Narrative/Plan: 


20-year-old male who presented to the emergency department 3 days ago with in

creased edema and foamy urine with a past medical history of nephrotic syndrome 

diagnosed in his teens.  He follows with nephrology, he had recently been on a 

tapered dose of steroids, he states he ran out 1-2 weeks ago and did not get a 

refill.  States he has been on steroids for several years.  He was restarted 

this admission on prednisone 60 mg daily.  Reportedly been having some nausea 

and vomiting this admission, states improving.  Was able to eat lunch today 

which included pizza with no vomiting.  Gastroenterology was asked to see 

patient regarding nausea and vomiting. He denies any coffee-ground emesis or 

hematemesis.  Denies any black stools or blood in his stool.  Nausea and vomitin

g has improved, again patient is tolerating eating a regular diet.


Status: Acute   Code(s): R11.2 - NAUSEA WITH VOMITING, UNSPECIFIED   SNOMED 

Code(s): 14663101


   





(2) Nephrotic syndrome


Status: Acute   Code(s): N04.9 - NEPHROTIC SYNDROME WITH UNSPECIFIED MORPHOLOGIC

 CHANGES   SNOMED Code(s): 90496427


   


Plan: 





1.  Continue antibiotics


2.  Protonix 40 mg twice a day


3.  Continue symptomatic and supportive care


4.  Diet as tolerated


5.  Plans on endoscopic evaluation, symptoms have improved.  Patient may be 

discharged home from a gastroenterology standpoint.


Thank you for this consultation, we will sign off at this time.











Dr. JACQUELINE Mcgrath


I agree with the dictator's note, documented as a scribe by Felicity DE.

## 2021-06-17 NOTE — PN
PROGRESS NOTE



Patient is seen for followup for recurrent minimal change disease.  Steroids have been

restarted.  The patient's 24 hour urine for protein is currently pending.  He is

complaining of nausea and has not been able to eat much.



PHYSICAL EXAMINATION:

On examination today, blood pressure is 148/76, heart rate 102 per minute, he is

afebrile.  Examination of the lower extremities shows no evidence of edema.  Patient is

euvolemic.  CNS exam grossly intact.  Abdomen is soft.



LAB:

Show sodium 130 from 06/16/2021, potassium 5.3, serum creatinine 1.0.



ASSESSMENT:

1. Relapsing minimal change disease, restarted on steroids with plans for possible

    change to Prograf versus consideration of Rituxan as outpatient.

2. Nausea and vomiting, possible underlying gastritis, started on proton pump

    inhibitors.  The patient may need GI evaluation if he is not improved.

3. Lower extremity edema from nephrotic syndrome, status post IV Lasix.  I will

    discontinue the Lasix.



PLAN:

DC Lasix.  Continue prednisone.  Consider GI evaluation if the nausea persists.





MMODL / IJN: [id number] / Job#: [id number]

## 2021-06-18 LAB — PROT 24H UR-MRATE: (no result) MG/24HR

## 2021-07-14 ENCOUNTER — HOSPITAL ENCOUNTER (OUTPATIENT)
Dept: HOSPITAL 47 - LABWHC1 | Age: 21
Discharge: HOME | End: 2021-07-14
Attending: INTERNAL MEDICINE
Payer: COMMERCIAL

## 2021-07-14 DIAGNOSIS — N05.0: Primary | ICD-10-CM

## 2021-07-14 DIAGNOSIS — N39.0: ICD-10-CM

## 2021-07-14 LAB
ALBUMIN SERPL-MCNC: 4.2 G/DL (ref 3.8–4.9)
ALBUMIN/GLOB SERPL: 2.47 G/DL (ref 1.6–3.17)
ALP SERPL-CCNC: 60 U/L (ref 41–126)
ALT SERPL-CCNC: 15 U/L (ref 10–49)
ANION GAP SERPL CALC-SCNC: 10.1 MMOL/L (ref 4–12)
AST SERPL-CCNC: 16 U/L (ref 14–35)
BUN SERPL-SCNC: 14 MG/DL (ref 9–27)
BUN/CREAT SERPL: 15.56 RATIO (ref 12–20)
CALCIUM SPEC-MCNC: 8.8 MG/DL (ref 8.7–10.3)
CHLORIDE SERPL-SCNC: 109 MMOL/L (ref 96–109)
CO2 SERPL-SCNC: 22.9 MMOL/L (ref 21.6–31.8)
GLOBULIN SER CALC-MCNC: 1.7 G/DL (ref 1.6–3.3)
GLUCOSE SERPL-MCNC: 119 MG/DL (ref 70–110)
MAGNESIUM SPEC-SCNC: 2 MG/DL (ref 1.5–2.4)
PH UR: 5.5 [PH] (ref 5–8)
POTASSIUM SERPL-SCNC: 3.7 MMOL/L (ref 3.5–5.5)
PROT SERPL-MCNC: 5.9 G/DL (ref 6.2–8.2)
PROT/CREAT UR-RTO: 0.02
SODIUM SERPL-SCNC: 142 MMOL/L (ref 135–145)
SP GR UR: 1.03 (ref 1–1.03)
UROBILINOGEN UR QL STRIP: <2 MG/DL (ref ?–2)

## 2021-07-14 PROCEDURE — 80053 COMPREHEN METABOLIC PANEL: CPT

## 2021-07-14 PROCEDURE — 82570 ASSAY OF URINE CREATININE: CPT

## 2021-07-14 PROCEDURE — 84156 ASSAY OF PROTEIN URINE: CPT

## 2021-07-14 PROCEDURE — 81003 URINALYSIS AUTO W/O SCOPE: CPT

## 2021-07-14 PROCEDURE — 83735 ASSAY OF MAGNESIUM: CPT

## 2021-07-14 PROCEDURE — 36415 COLL VENOUS BLD VENIPUNCTURE: CPT

## 2021-07-28 ENCOUNTER — HOSPITAL ENCOUNTER (OUTPATIENT)
Dept: HOSPITAL 47 - LABWHC1 | Age: 21
Discharge: HOME | End: 2021-07-28
Attending: INTERNAL MEDICINE
Payer: COMMERCIAL

## 2021-07-28 DIAGNOSIS — E55.9: ICD-10-CM

## 2021-07-28 DIAGNOSIS — N05.0: Primary | ICD-10-CM

## 2021-07-28 DIAGNOSIS — N39.0: ICD-10-CM

## 2021-07-28 DIAGNOSIS — M10.9: ICD-10-CM

## 2021-07-28 DIAGNOSIS — N25.81: ICD-10-CM

## 2021-07-28 DIAGNOSIS — D64.9: ICD-10-CM

## 2021-07-28 LAB
ERYTHROCYTE [DISTWIDTH] IN BLOOD BY AUTOMATED COUNT: 5.31 X 10*6/UL (ref 4.4–5.6)
ERYTHROCYTE [DISTWIDTH] IN BLOOD: 13.5 % (ref 11.5–14.5)
HCT VFR BLD AUTO: 46.8 % (ref 39.6–50)
HGB BLD-MCNC: 15 G/DL (ref 13–17)
MCH RBC QN AUTO: 28.2 PG (ref 27–32)
MCHC RBC AUTO-ENTMCNC: 32.1 G/DL (ref 32–37)
MCV RBC AUTO: 88.1 FL (ref 80–97)
PH UR: 6 [PH] (ref 5–8)
PLATELET # BLD AUTO: 301 X 10*3/UL (ref 140–440)
PROT/CREAT UR-RTO: 0.03
SP GR UR: 1.03 (ref 1–1.03)
UROBILINOGEN UR QL STRIP: 2 MG/DL (ref ?–2)
WBC # BLD AUTO: 11.57 X 10*3/UL (ref 4.5–10)

## 2021-07-28 PROCEDURE — 85027 COMPLETE CBC AUTOMATED: CPT

## 2021-07-28 PROCEDURE — 82728 ASSAY OF FERRITIN: CPT

## 2021-07-28 PROCEDURE — 81003 URINALYSIS AUTO W/O SCOPE: CPT

## 2021-07-28 PROCEDURE — 82570 ASSAY OF URINE CREATININE: CPT

## 2021-07-28 PROCEDURE — 83970 ASSAY OF PARATHORMONE: CPT

## 2021-07-28 PROCEDURE — 83735 ASSAY OF MAGNESIUM: CPT

## 2021-07-28 PROCEDURE — 84550 ASSAY OF BLOOD/URIC ACID: CPT

## 2021-07-28 PROCEDURE — 80053 COMPREHEN METABOLIC PANEL: CPT

## 2021-07-28 PROCEDURE — 84100 ASSAY OF PHOSPHORUS: CPT

## 2021-07-28 PROCEDURE — 82306 VITAMIN D 25 HYDROXY: CPT

## 2021-07-28 PROCEDURE — 84156 ASSAY OF PROTEIN URINE: CPT

## 2021-07-28 PROCEDURE — 36415 COLL VENOUS BLD VENIPUNCTURE: CPT

## 2021-07-28 PROCEDURE — 83550 IRON BINDING TEST: CPT

## 2021-07-28 PROCEDURE — 83540 ASSAY OF IRON: CPT

## 2021-07-28 PROCEDURE — 81050 URINALYSIS VOLUME MEASURE: CPT

## 2021-07-29 LAB
ALBUMIN SERPL-MCNC: 4.3 G/DL (ref 3.8–4.9)
ALBUMIN/GLOB SERPL: 2.26 G/DL (ref 1.6–3.17)
ALP SERPL-CCNC: 57 U/L (ref 41–126)
ALT SERPL-CCNC: 14 U/L (ref 10–49)
ANION GAP SERPL CALC-SCNC: 11.6 MMOL/L (ref 4–12)
AST SERPL-CCNC: 12 U/L (ref 14–35)
BUN SERPL-SCNC: 14 MG/DL (ref 9–27)
BUN/CREAT SERPL: 15.56 RATIO (ref 12–20)
CALCIUM SPEC-MCNC: 9.1 MG/DL (ref 8.7–10.3)
CHLORIDE SERPL-SCNC: 108 MMOL/L (ref 96–109)
CO2 SERPL-SCNC: 22.4 MMOL/L (ref 21.6–31.8)
FERRITIN SERPL-MCNC: 58.1 NG/ML (ref 22–322)
GLOBULIN SER CALC-MCNC: 1.9 G/DL (ref 1.6–3.3)
GLUCOSE SERPL-MCNC: 129 MG/DL (ref 70–110)
IRON SERPL-MCNC: 144 UG/DL (ref 65–175)
MAGNESIUM SPEC-SCNC: 1.9 MG/DL (ref 1.5–2.4)
POTASSIUM SERPL-SCNC: 3.3 MMOL/L (ref 3.5–5.5)
PROT 24H UR-MRATE: 103.6 MG/24HR
PROT SERPL-MCNC: 6.2 G/DL (ref 6.2–8.2)
SODIUM SERPL-SCNC: 142 MMOL/L (ref 135–145)
SPECIMEN VOL 24H UR: 700 ML
TIBC SERPL-MCNC: 307 UG/DL (ref 228–460)
URATE SERPL-MCNC: 5.6 MG/DL (ref 3.7–8.7)

## 2021-10-14 ENCOUNTER — HOSPITAL ENCOUNTER (OUTPATIENT)
Dept: HOSPITAL 47 - LABWHC1 | Age: 21
Discharge: HOME | End: 2021-10-14
Attending: NURSE PRACTITIONER
Payer: COMMERCIAL

## 2021-10-14 DIAGNOSIS — N39.0: ICD-10-CM

## 2021-10-14 DIAGNOSIS — D64.9: ICD-10-CM

## 2021-10-14 DIAGNOSIS — N18.1: Primary | ICD-10-CM

## 2021-10-14 DIAGNOSIS — E55.9: ICD-10-CM

## 2021-10-14 LAB
BASOPHILS # BLD AUTO: 0.03 X 10*3/UL (ref 0–0.1)
BASOPHILS NFR BLD AUTO: 0.4 %
EOSINOPHIL # BLD AUTO: 0.16 X 10*3/UL (ref 0.04–0.35)
EOSINOPHIL NFR BLD AUTO: 2 %
ERYTHROCYTE [DISTWIDTH] IN BLOOD BY AUTOMATED COUNT: 5.35 X 10*6/UL (ref 4.4–5.6)
ERYTHROCYTE [DISTWIDTH] IN BLOOD: 12.8 % (ref 11.5–14.5)
HCT VFR BLD AUTO: 46.5 % (ref 39.6–50)
HGB BLD-MCNC: 15.4 G/DL (ref 13–17)
LYMPHOCYTES # SPEC AUTO: 2.45 X 10*3/UL (ref 0.9–5)
LYMPHOCYTES NFR SPEC AUTO: 29.9 %
MCH RBC QN AUTO: 28.8 PG (ref 27–32)
MCHC RBC AUTO-ENTMCNC: 33.1 G/DL (ref 32–37)
MCV RBC AUTO: 86.9 FL (ref 80–97)
MONOCYTES # BLD AUTO: 0.71 X 10*3/UL (ref 0.2–1)
MONOCYTES NFR BLD AUTO: 8.7 %
NEUTROPHILS # BLD AUTO: 4.8 X 10*3/UL (ref 1.8–7.7)
NEUTROPHILS NFR BLD AUTO: 58.5 %
PH UR: 5.5 [PH] (ref 5–8)
PLATELET # BLD AUTO: 271 X 10*3/UL (ref 140–440)
SP GR UR: 1.03 (ref 1–1.03)
UROBILINOGEN UR QL STRIP: <2 MG/DL (ref ?–2)
WBC # BLD AUTO: 8.19 X 10*3/UL (ref 4.5–10)

## 2021-10-14 PROCEDURE — 84156 ASSAY OF PROTEIN URINE: CPT

## 2021-10-14 PROCEDURE — 80053 COMPREHEN METABOLIC PANEL: CPT

## 2021-10-14 PROCEDURE — 83735 ASSAY OF MAGNESIUM: CPT

## 2021-10-14 PROCEDURE — 81003 URINALYSIS AUTO W/O SCOPE: CPT

## 2021-10-14 PROCEDURE — 85025 COMPLETE CBC W/AUTO DIFF WBC: CPT

## 2021-10-14 PROCEDURE — 82306 VITAMIN D 25 HYDROXY: CPT

## 2021-10-14 PROCEDURE — 36415 COLL VENOUS BLD VENIPUNCTURE: CPT

## 2021-10-14 PROCEDURE — 82570 ASSAY OF URINE CREATININE: CPT

## 2021-10-15 LAB
ALBUMIN SERPL-MCNC: 4.5 G/DL (ref 3.8–4.9)
ALBUMIN/GLOB SERPL: 2.3 G/DL (ref 1.6–3.17)
ALP SERPL-CCNC: 58 U/L (ref 41–126)
ALT SERPL-CCNC: 11 U/L (ref 10–49)
ANION GAP SERPL CALC-SCNC: 19.3 MMOL/L (ref 4–12)
AST SERPL-CCNC: 10 U/L (ref 14–35)
BUN SERPL-SCNC: 13.7 MG/DL (ref 9–27)
BUN/CREAT SERPL: 15.04 RATIO (ref 12–20)
CALCIUM SPEC-MCNC: 9.5 MG/DL (ref 8.7–10.3)
CHLORIDE SERPL-SCNC: 105 MMOL/L (ref 96–109)
CO2 SERPL-SCNC: 17.7 MMOL/L (ref 21.6–31.8)
GLOBULIN SER CALC-MCNC: 2 G/DL (ref 1.6–3.3)
GLUCOSE SERPL-MCNC: 115 MG/DL (ref 70–110)
MAGNESIUM SPEC-SCNC: 2 MG/DL (ref 1.5–2.4)
POTASSIUM SERPL-SCNC: 3.9 MMOL/L (ref 3.5–5.5)
PROT SERPL-MCNC: 6.4 G/DL (ref 6.2–8.2)
SODIUM SERPL-SCNC: 142 MMOL/L (ref 135–145)

## 2021-10-29 ENCOUNTER — HOSPITAL ENCOUNTER (OUTPATIENT)
Dept: HOSPITAL 47 - LABWHC1 | Age: 21
Discharge: HOME | End: 2021-10-29
Attending: INTERNAL MEDICINE
Payer: COMMERCIAL

## 2021-10-29 DIAGNOSIS — N18.1: Primary | ICD-10-CM

## 2021-10-29 PROCEDURE — 80158 DRUG ASSAY CYCLOSPORINE: CPT

## 2021-10-29 PROCEDURE — 36415 COLL VENOUS BLD VENIPUNCTURE: CPT

## 2021-11-02 ENCOUNTER — HOSPITAL ENCOUNTER (OUTPATIENT)
Dept: HOSPITAL 47 - LABWHC1 | Age: 21
Discharge: HOME | End: 2021-11-02
Attending: INTERNAL MEDICINE
Payer: COMMERCIAL

## 2021-11-02 DIAGNOSIS — N18.1: Primary | ICD-10-CM

## 2021-11-02 PROCEDURE — 80158 DRUG ASSAY CYCLOSPORINE: CPT

## 2021-11-02 PROCEDURE — 36415 COLL VENOUS BLD VENIPUNCTURE: CPT

## 2022-02-18 ENCOUNTER — HOSPITAL ENCOUNTER (OUTPATIENT)
Dept: HOSPITAL 47 - LABWHC1 | Age: 22
Discharge: HOME | End: 2022-02-18
Attending: NURSE PRACTITIONER
Payer: COMMERCIAL

## 2022-02-18 DIAGNOSIS — N05.0: Primary | ICD-10-CM

## 2022-02-18 PROCEDURE — 80158 DRUG ASSAY CYCLOSPORINE: CPT

## 2022-02-18 PROCEDURE — 36415 COLL VENOUS BLD VENIPUNCTURE: CPT

## 2022-05-11 ENCOUNTER — HOSPITAL ENCOUNTER (INPATIENT)
Dept: HOSPITAL 47 - EC | Age: 22
LOS: 4 days | Discharge: HOME | DRG: 699 | End: 2022-05-15
Attending: HOSPITALIST | Admitting: HOSPITALIST
Payer: COMMERCIAL

## 2022-05-11 DIAGNOSIS — Z79.52: ICD-10-CM

## 2022-05-11 DIAGNOSIS — T45.1X6A: ICD-10-CM

## 2022-05-11 DIAGNOSIS — Z91.14: ICD-10-CM

## 2022-05-11 DIAGNOSIS — N04.0: Primary | ICD-10-CM

## 2022-05-11 DIAGNOSIS — E87.1: ICD-10-CM

## 2022-05-11 DIAGNOSIS — N17.9: ICD-10-CM

## 2022-05-11 DIAGNOSIS — Z87.09: ICD-10-CM

## 2022-05-11 DIAGNOSIS — K29.70: ICD-10-CM

## 2022-05-11 DIAGNOSIS — E86.0: ICD-10-CM

## 2022-05-11 DIAGNOSIS — J45.20: ICD-10-CM

## 2022-05-11 LAB
ALBUMIN SERPL-MCNC: 1.8 G/DL (ref 3.5–5)
ALP SERPL-CCNC: 129 U/L (ref 38–126)
ALT SERPL-CCNC: 11 U/L (ref 4–49)
ANION GAP SERPL CALC-SCNC: 1 MMOL/L
AST SERPL-CCNC: 42 U/L (ref 17–59)
BASOPHILS # BLD AUTO: 0 K/UL (ref 0–0.2)
BASOPHILS NFR BLD AUTO: 0 %
BUN SERPL-SCNC: 15 MG/DL (ref 9–20)
CALCIUM SPEC-MCNC: 7.2 MG/DL (ref 8.4–10.2)
CHLORIDE SERPL-SCNC: 104 MMOL/L (ref 98–107)
CO2 SERPL-SCNC: 24 MMOL/L (ref 22–30)
EOSINOPHIL # BLD AUTO: 0.1 K/UL (ref 0–0.7)
EOSINOPHIL NFR BLD AUTO: 1 %
ERYTHROCYTE [DISTWIDTH] IN BLOOD BY AUTOMATED COUNT: 6.36 M/UL (ref 4.3–5.9)
ERYTHROCYTE [DISTWIDTH] IN BLOOD: 13.1 % (ref 11.5–15.5)
GLUCOSE SERPL-MCNC: 113 MG/DL (ref 74–99)
HCT VFR BLD AUTO: 55 % (ref 39–53)
HGB BLD-MCNC: 18.2 GM/DL (ref 13–17.5)
LYMPHOCYTES # SPEC AUTO: 0.8 K/UL (ref 1–4.8)
LYMPHOCYTES NFR SPEC AUTO: 6 %
MCH RBC QN AUTO: 28.7 PG (ref 25–35)
MCHC RBC AUTO-ENTMCNC: 33.1 G/DL (ref 31–37)
MCV RBC AUTO: 86.6 FL (ref 80–100)
MONOCYTES # BLD AUTO: 0.4 K/UL (ref 0–1)
MONOCYTES NFR BLD AUTO: 3 %
NEUTROPHILS # BLD AUTO: 12.9 K/UL (ref 1.3–7.7)
NEUTROPHILS NFR BLD AUTO: 90 %
PH UR: 7 [PH] (ref 5–8)
PLATELET # BLD AUTO: 432 K/UL (ref 150–450)
POTASSIUM SERPL-SCNC: 4.3 MMOL/L (ref 3.5–5.1)
PROT SERPL-MCNC: 4.8 G/DL (ref 6.3–8.2)
PROT UR QL: (no result)
RBC UR QL: 3 /HPF (ref 0–5)
SODIUM SERPL-SCNC: 129 MMOL/L (ref 137–145)
SP GR UR: >1.05 (ref 1–1.03)
UROBILINOGEN UR QL STRIP: 2 MG/DL (ref ?–2)
WBC # BLD AUTO: 14.4 K/UL (ref 3.8–10.6)
WBC # UR AUTO: 31 /HPF (ref 0–5)

## 2022-05-11 PROCEDURE — 76770 US EXAM ABDO BACK WALL COMP: CPT

## 2022-05-11 PROCEDURE — 87635 SARS-COV-2 COVID-19 AMP PRB: CPT

## 2022-05-11 PROCEDURE — 96374 THER/PROPH/DIAG INJ IV PUSH: CPT

## 2022-05-11 PROCEDURE — 87502 INFLUENZA DNA AMP PROBE: CPT

## 2022-05-11 PROCEDURE — 83735 ASSAY OF MAGNESIUM: CPT

## 2022-05-11 PROCEDURE — 80048 BASIC METABOLIC PNL TOTAL CA: CPT

## 2022-05-11 PROCEDURE — 81050 URINALYSIS VOLUME MEASURE: CPT

## 2022-05-11 PROCEDURE — 80053 COMPREHEN METABOLIC PANEL: CPT

## 2022-05-11 PROCEDURE — 84295 ASSAY OF SERUM SODIUM: CPT

## 2022-05-11 PROCEDURE — 85025 COMPLETE CBC W/AUTO DIFF WBC: CPT

## 2022-05-11 PROCEDURE — 99285 EMERGENCY DEPT VISIT HI MDM: CPT

## 2022-05-11 PROCEDURE — 96361 HYDRATE IV INFUSION ADD-ON: CPT

## 2022-05-11 PROCEDURE — 36415 COLL VENOUS BLD VENIPUNCTURE: CPT

## 2022-05-11 PROCEDURE — 87086 URINE CULTURE/COLONY COUNT: CPT

## 2022-05-11 PROCEDURE — 82570 ASSAY OF URINE CREATININE: CPT

## 2022-05-11 PROCEDURE — 81001 URINALYSIS AUTO W/SCOPE: CPT

## 2022-05-11 PROCEDURE — 84156 ASSAY OF PROTEIN URINE: CPT

## 2022-05-11 PROCEDURE — 80158 DRUG ASSAY CYCLOSPORINE: CPT

## 2022-05-11 NOTE — ED
General Adult HPI





- General


Chief complaint: Nausea/Vomiting/Diarrhea


Stated complaint: nausea


Time Seen by Provider: 05/11/22 16:15


Source: patient, EMS


Mode of arrival: EMS


Limitations: no limitations





- History of Present Illness


Initial comments: 





21-year-old male, alert and oriented 4, presents to the emergency room with 

complaints of feeling queasy with nausea for 2 days. States today vomited once 

clear in color.  He denies abdominal pain.  Denies any dysuria.  Patient states 

that he felt weak and dehydrated so he went to urgent care today. States they 

did a urine test and found protein and blood in his urine and referred him to 

the emergency room.  Patient does have a history of nephrotic syndrome.  Does 

not remember the names of medications that he is taking. 


-: days(s) (2)


Location: abdomen


Severity scale (1-10): 0


Consistency: now resolved


Improves with: medication (zofran)


Associated Symptoms: nausea/vomiting


Treatments Prior to Arrival: other (iv fluids and zofran by ems, UA at urgent 

care)





- Related Data


                                Home Medications











 Medication  Instructions  Recorded  Confirmed


 


No Known Home Medications  05/11/22 05/11/22











                                    Allergies











Allergy/AdvReac Type Severity Reaction Status Date / Time


 


Penicillins Allergy  Family Verified 05/11/22 17:19





   History  














Review of Systems


ROS Statement: 


Those systems with pertinent positive or pertinent negative responses have been 

documented in the HPI.





ROS Other: All systems not noted in ROS Statement are negative.





Past Medical History


Past Medical History: Asthma, Renal Disease


Additional Past Medical History / Comment(s): nephrotic syndrome, asthma as a 

child


History of Any Multi-Drug Resistant Organisms: None Reported


Past Surgical History: No Surgical Hx Reported


Additional Past Surgical History / Comment(s): L renal parenchymal biopsy


Past Anesthesia/Blood Transfusion Reactions: Unable to Obtain


Additional Past Anesthesia/Blood Transfusion Reaction / Comment(s): Pt has never

had surgery


Past Psychological History: No Psychological Hx Reported


Smoking Status: Never smoker


Past Alcohol Use History: None Reported


Past Drug Use History: None Reported





- Past Family History


  ** Mother


Additional Family Medical History / Comment(s): Mother has motion sickness.  Pt 

cannot recall any other hx.





  ** Father


History Unknown: Yes





General Exam


Limitations: no limitations


General appearance: alert, in no apparent distress


Head exam: Present: atraumatic


Eye exam: Present: normal appearance.  Absent: scleral icterus, conjunctival 

injection, periorbital swelling, periorbital tenderness


ENT exam: Present: normal exam, normal oropharynx, mucous membranes moist


Neck exam: Present: normal inspection, full ROM.  Absent: tenderness, 

meningismus


Respiratory exam: Present: normal lung sounds bilaterally.  Absent: respiratory 

distress, accessory muscle use


Cardiovascular Exam: Present: regular rate, normal rhythm


GI/Abdominal exam: Present: soft.  Absent: distended, tenderness, guarding, 

rebound, rigid


Extremities exam: Present: normal inspection, full ROM, normal capillary refill,

pedal edema (trace).  Absent: tenderness


Back exam: Present: normal inspection, full ROM.  Absent: tenderness, CVA 

tenderness (R), CVA tenderness (L), rash noted


Neurological exam: Present: alert, oriented X3


Psychiatric exam: Present: normal affect, normal mood


Skin exam: Present: warm, dry, intact.  Absent: cyanosis, diaphoretic, 

petechiae, pallor





Course


                                   Vital Signs











  05/11/22 05/11/22 05/11/22





  15:09 19:32 21:40


 


Temperature 97.8 F  


 


Pulse Rate 91 94 92


 


Respiratory 16 16 14





Rate   


 


Blood Pressure 115/89 123/91 121/83


 


O2 Sat by Pulse 97 98 100





Oximetry   














Medical Decision Making





- Medical Decision Making





21-year-old male presents with nausea and feeling dehydrated of the past 2 days.

 States he does have a history of nephrotic syndrome.  He does not know the 

names of the medications that he is taking.


Influenza and coronavirus swabs are negative.  UA shows 4+ protein with a high 

specific gravity.  His creatinine is elevated at 1.36, sodium of 129, albumin is

1.8.  


Patient's nausea has resolved with Zofran and IV fluids.  It is not clear if the

patient is compliant with his medication regimen. 


I did discuss this case with nephrology Dr. Trevino who recommended admission for

nephrotic syndrome. 


Upon reassessment the patient has developed slight swelling to his upper eyelids

and edema to bilateral lower extremities.


Patient is agreeable to this plan of care.








- Lab Data


Result diagrams: 


                                 05/11/22 19:36





                                 05/11/22 19:15


                                   Lab Results











  05/11/22 05/11/22 05/11/22 Range/Units





  16:34 16:34 18:20 


 


WBC     (3.8-10.6)  k/uL


 


RBC     (4.30-5.90)  m/uL


 


Hgb     (13.0-17.5)  gm/dL


 


Hct     (39.0-53.0)  %


 


MCV     (80.0-100.0)  fL


 


MCH     (25.0-35.0)  pg


 


MCHC     (31.0-37.0)  g/dL


 


RDW     (11.5-15.5)  %


 


Plt Count     (150-450)  k/uL


 


MPV     


 


Neutrophils %     %


 


Lymphocytes %     %


 


Monocytes %     %


 


Eosinophils %     %


 


Basophils %     %


 


Neutrophils #     (1.3-7.7)  k/uL


 


Lymphocytes #     (1.0-4.8)  k/uL


 


Monocytes #     (0-1.0)  k/uL


 


Eosinophils #     (0-0.7)  k/uL


 


Basophils #     (0-0.2)  k/uL


 


Sodium     (137-145)  mmol/L


 


Potassium     (3.5-5.1)  mmol/L


 


Chloride     ()  mmol/L


 


Carbon Dioxide     (22-30)  mmol/L


 


Anion Gap     mmol/L


 


BUN     (9-20)  mg/dL


 


Creatinine     (0.66-1.25)  mg/dL


 


Est GFR (CKD-EPI)AfAm     (>60 ml/min/1.73 sqM)  


 


Est GFR (CKD-EPI)NonAf     (>60 ml/min/1.73 sqM)  


 


Glucose     (74-99)  mg/dL


 


Calcium     (8.4-10.2)  mg/dL


 


Total Bilirubin     (0.2-1.3)  mg/dL


 


AST     (17-59)  U/L


 


ALT     (4-49)  U/L


 


Alkaline Phosphatase     ()  U/L


 


Total Protein     (6.3-8.2)  g/dL


 


Albumin     (3.5-5.0)  g/dL


 


Urine Color    Yellow  


 


Urine Appearance    Cloudy  (Clear)  


 


Urine pH    7.0  (5.0-8.0)  


 


Ur Specific Gravity    >1.050 H  (1.001-1.035)  


 


Urine Protein    4+ H  (Negative)  


 


Urine Glucose (UA)    Negative  (Negative)  


 


Urine Ketones    Trace H  (Negative)  


 


Urine Blood    Moderate H  (Negative)  


 


Urine Nitrite    Negative  (Negative)  


 


Urine Bilirubin    Negative  (Negative)  


 


Urine Urobilinogen    2.0  (<2.0)  mg/dL


 


Ur Leukocyte Esterase    Negative  (Negative)  


 


Urine RBC    3  (0-5)  /hpf


 


Urine WBC    31 H  (0-5)  /hpf


 


Coronavirus (PCR)   Not Detected   (Not Detectd)  


 


Influenza Type A RNA  Not Detected    (Not Detectd)  


 


Influenza Type B (PCR)  Not Detected    (Not Detectd)  














  05/11/22 05/11/22 Range/Units





  19:15 19:36 


 


WBC   14.4 H  (3.8-10.6)  k/uL


 


RBC   6.36 H  (4.30-5.90)  m/uL


 


Hgb   18.2 H  (13.0-17.5)  gm/dL


 


Hct   55.0 H  (39.0-53.0)  %


 


MCV   86.6  (80.0-100.0)  fL


 


MCH   28.7  (25.0-35.0)  pg


 


MCHC   33.1  (31.0-37.0)  g/dL


 


RDW   13.1  (11.5-15.5)  %


 


Plt Count   432  (150-450)  k/uL


 


MPV   7.6  


 


Neutrophils %   90  %


 


Lymphocytes %   6  %


 


Monocytes %   3  %


 


Eosinophils %   1  %


 


Basophils %   0  %


 


Neutrophils #   12.9 H  (1.3-7.7)  k/uL


 


Lymphocytes #   0.8 L  (1.0-4.8)  k/uL


 


Monocytes #   0.4  (0-1.0)  k/uL


 


Eosinophils #   0.1  (0-0.7)  k/uL


 


Basophils #   0.0  (0-0.2)  k/uL


 


Sodium  129 L   (137-145)  mmol/L


 


Potassium  4.3   (3.5-5.1)  mmol/L


 


Chloride  104   ()  mmol/L


 


Carbon Dioxide  24   (22-30)  mmol/L


 


Anion Gap  1   mmol/L


 


BUN  15   (9-20)  mg/dL


 


Creatinine  1.36 H   (0.66-1.25)  mg/dL


 


Est GFR (CKD-EPI)AfAm  85   (>60 ml/min/1.73 sqM)  


 


Est GFR (CKD-EPI)NonAf  74   (>60 ml/min/1.73 sqM)  


 


Glucose  113 H   (74-99)  mg/dL


 


Calcium  7.2 L   (8.4-10.2)  mg/dL


 


Total Bilirubin  0.5   (0.2-1.3)  mg/dL


 


AST  42   (17-59)  U/L


 


ALT  11   (4-49)  U/L


 


Alkaline Phosphatase  129 H   ()  U/L


 


Total Protein  4.8 L   (6.3-8.2)  g/dL


 


Albumin  1.8 L   (3.5-5.0)  g/dL


 


Urine Color    


 


Urine Appearance    (Clear)  


 


Urine pH    (5.0-8.0)  


 


Ur Specific Gravity    (1.001-1.035)  


 


Urine Protein    (Negative)  


 


Urine Glucose (UA)    (Negative)  


 


Urine Ketones    (Negative)  


 


Urine Blood    (Negative)  


 


Urine Nitrite    (Negative)  


 


Urine Bilirubin    (Negative)  


 


Urine Urobilinogen    (<2.0)  mg/dL


 


Ur Leukocyte Esterase    (Negative)  


 


Urine RBC    (0-5)  /hpf


 


Urine WBC    (0-5)  /hpf


 


Coronavirus (PCR)    (Not Detectd)  


 


Influenza Type A RNA    (Not Detectd)  


 


Influenza Type B (PCR)    (Not Detectd)  














Disposition


Clinical Impression: 


 Nephrotic syndrome





Disposition: ADMITTED AS IP TO THIS [facility]


Decision Date: 05/11/22


Decision Time: 20:14

## 2022-05-12 LAB
ALBUMIN SERPL-MCNC: 1.7 G/DL (ref 3.5–5)
ALP SERPL-CCNC: 120 U/L (ref 38–126)
ALT SERPL-CCNC: 11 U/L (ref 4–49)
ANION GAP SERPL CALC-SCNC: 3 MMOL/L
AST SERPL-CCNC: 29 U/L (ref 17–59)
BUN SERPL-SCNC: 27 MG/DL (ref 9–20)
CALCIUM SPEC-MCNC: 7.1 MG/DL (ref 8.4–10.2)
CHLORIDE SERPL-SCNC: 103 MMOL/L (ref 98–107)
CO2 SERPL-SCNC: 22 MMOL/L (ref 22–30)
GLUCOSE SERPL-MCNC: 121 MG/DL (ref 74–99)
POTASSIUM SERPL-SCNC: 4.5 MMOL/L (ref 3.5–5.1)
PROT SERPL-MCNC: 4.6 G/DL (ref 6.3–8.2)
SODIUM SERPL-SCNC: 128 MMOL/L (ref 137–145)

## 2022-05-12 RX ADMIN — ONDANSETRON PRN MG: 2 INJECTION INTRAMUSCULAR; INTRAVENOUS at 09:49

## 2022-05-12 RX ADMIN — HEPARIN SODIUM SCH: 5000 INJECTION INTRAVENOUS; SUBCUTANEOUS at 16:50

## 2022-05-12 RX ADMIN — HEPARIN SODIUM SCH UNIT: 5000 INJECTION INTRAVENOUS; SUBCUTANEOUS at 22:57

## 2022-05-12 RX ADMIN — CEFAZOLIN SCH: 330 INJECTION, POWDER, FOR SOLUTION INTRAMUSCULAR; INTRAVENOUS at 20:55

## 2022-05-12 RX ADMIN — HEPARIN SODIUM SCH UNIT: 5000 INJECTION INTRAVENOUS; SUBCUTANEOUS at 08:38

## 2022-05-12 RX ADMIN — ONDANSETRON PRN MG: 2 INJECTION INTRAMUSCULAR; INTRAVENOUS at 21:01

## 2022-05-12 RX ADMIN — PANTOPRAZOLE SODIUM SCH MG: 40 INJECTION, POWDER, FOR SOLUTION INTRAVENOUS at 17:02

## 2022-05-12 NOTE — P.NPCON
History of Present Illness





- Reason for Consult


proteinuria





- History of Present Illness





Patient is a 21-year-old male with history of nephrotic syndrome secondary to 

minimal-change disease status post biopsy in 2020.  Patient is maintained on 

prednisone and cyclosporine.


Patient is admitted to the hospital with complaints of nausea and vomiting.  He 

had also noticed increased swelling in his legs recently.


Patient also reports that he had not been taking his prednisone regularly 

recently and had accidentally decreased it to 5 mg instead of 10 mg.





Last follow-up in the office was about a year ago.


Baseline creatinine about 0.9-1 mg/dL.  Last 24 hour urine for protein was 103.6

mg on 07/28/2021.





Currently patient is not able to eat.  He is also complaining of upper abdominal

pain.


Serum creatinine was 1.3 on admission and increased to 1.7 today.  Sodium was 

also low at 129 and then decrease further to 128.  Patient didn't receive IV 

fluid bolus yesterday.  Currently not on any IV fluids.


Blood pressure has been stable


Patient is afebrile





UA shows 4+ protein WBCs 31.





Review of Systems





As per HPI, other systems negative





Past Medical History


Past Medical History: Asthma, Renal Disease


Additional Past Medical History / Comment(s): nephrotic syndrome, asthma as a 

child


History of Any Multi-Drug Resistant Organisms: None Reported


Past Surgical History: No Surgical Hx Reported


Additional Past Surgical History / Comment(s): L renal parenchymal biopsy


Past Anesthesia/Blood Transfusion Reactions: Unable to Obtain


Additional Past Anesthesia/Blood Transfusion Reaction / Comment(s): Pt has never

had surgery


Past Psychological History: No Psychological Hx Reported


Smoking Status: Never smoker


Past Alcohol Use History: None Reported


Past Drug Use History: None Reported





- Past Family History


  ** Mother


Additional Family Medical History / Comment(s): Mother has motion sickness.  Pt 

cannot recall any other hx.





  ** Father


History Unknown: Yes





Medications and Allergies


                                Home Medications











 Medication  Instructions  Recorded  Confirmed  Type


 


No Known Home Medications  05/11/22 05/11/22 History








                                    Allergies











Allergy/AdvReac Type Severity Reaction Status Date / Time


 


Penicillins Allergy  Family Verified 05/11/22 17:19





   History  














Physical Exam


Vitals: 


                                   Vital Signs











  Temp Pulse Pulse Resp BP BP Pulse Ox


 


 05/12/22 07:45    90  18   


 


 05/12/22 07:39  98.1 F   90  18   119/78  100


 


 05/12/22 00:58  98.6 F   83  18   132/78  98


 


 05/11/22 23:04  97.8 F   97  16   149/77  96


 


 05/11/22 21:40   92   14  121/83   100


 


 05/11/22 19:32   94   16  123/91   98


 


 05/11/22 15:09  97.8 F  91   16  115/89   97








                                Intake and Output











 05/11/22 05/12/22 05/12/22





 22:59 06:59 14:59


 


Other:   


 


  Weight 86.183 kg 86.183 kg 














Patient is awake, comfortable he is complaining of abdominal discomfort.


Examination of the heart S1 and S2


Examination of the lungs bilateral breath sounds are heard


Abdomen is soft, mild tenderness in the upper abdomen


Examination of the lower extremities shows trace ankle edema


CNS exam is grossly intact.





Results





- Lab Results


                             Most recent lab results











Calcium  7.1 mg/dL (8.4-10.2)  L  05/12/22  11:39    














                                 05/11/22 19:36





                                 05/12/22 11:39





Assessment and Plan


Assessment: 





1.  Nephrotic syndrome with minimal change disease status post kidney biopsy in 

2020.  Patient had been in remission with last 24 hour urine protein of 103 mg 

in July 2021.


Patient currently has a relapse with significant proteinuria.  Etiology is 

likely noncompliance with medications.  Baseline creatinine around 0.9-1 mg/dL


2.  Acute kidney injury mostly prerenal.


3.  Nausea and vomiting possibly related to gastritis


4.  Hyponatremia, possibly hypovolemic.  Check urine osmolality and urine sodium

level and resume normal saline with repeat sodium in 4-5 hours.


Plan: 





Check protein creatinine ratio


Restart normal saline


Repeat sodium in 4-5 hours


Add protonix


Increase prednisone


Check cyclosporine level


Repeat labs in a.m.


Avoid any nephrotoxic agents.


Maintain salt and fluid restriction


Follow-up on urine cultures





Thank you for this consultation.  We'll continue to follow the patient with you 

during his hospitalization

## 2022-05-12 NOTE — P.PN
Subjective


Progress Note Date: 05/12/22





Hospital course: 





The patient is a 21-year-old male with a PMH of nephritic syndrome (follows with

Dr. Trevino) on prednisone chronically, and mild intermittent asthma who presents

to the emergency room with complaints of nausea and vomiting.  The patient 

reports that he had not been feeling well over the past 2-3 days, and felt 

fatigued and dehydrated.  The patient went to an urgent care center earlier 

today where a UA revealed protein, ketones, and blood, and the patient was subs

equently referred to the emergency room.  He reports ongoing nausea, slightly 

improved from his presentation.  Reports that over the past 2-3 days, he has 

been urinating significantly less than usual, and that his urine is darker, 

almost brown in color.  He also states that he had missed several doses of his 

prednisone.  He denies experiencing abdominal pain, diarrhea, dysuria.  Also 

denied chest discomfort, fever, chills, cough.  Laboratory evaluation in the 

emergency room revealed creatinine 1.36 (baseline 0.9), WBC count 14.4, sodium 

129, albumin 1.8.  UA revealed ketones, moderate blood, and 4+ protein.





Physical examination:





Patient seen and fully evaluated at the bedside this morning.  Patient reports 

he still feels extremely fatigued and nauseous.  Patient states he is still 

unable to hold down any pills or oral intake.  Repeat morning labs reveal 

worsening hyponatremia with sodium of 128 and acute kidney injury with BUN of 

27, creatinine 1.71, and GFR 56.  Worsening hypoalbuminemia with albumin of 1.7.

 Patient did receive initial dose of Solu-Medrol and to continue with cyc

losporine and prednisone at this time.  Patient completing 24-hour urine 

creatinine and protein at this time and receiving continuous IV fluid hydration 

with 0.9% normal saline.





General: non toxic, no distress, appears at stated age


Derm: warm, dry


Head: atraumatic, normocephalic, symmetric


Eyes: EOMI, no lid lag, anicteric sclera


Mouth: no lip lesion, mucus membranes moist


Cardiovascular: S1S2 reg, no murmur, positive posterior tibial pulse bilateral, 


Lungs: CTA bilateral, no rhonchi, no rales , no accessory muscle use


Abdominal: soft, nontender to palpation, no guarding, no appreciable org

anomegaly


Ext: no gross muscle atrophy, no edema, no contractures


Neuro: CN II-XI grossly intact, no focal neuro deficits


Psych: Alert, oriented, appropriate affect 





Assessment and plan of care:





Nephritic syndrome with hypoalbuminemia


Hyponatremia


Acute kidney injury


Leukocytosis


-Patient received dose of Solu-Medrol and to continue with cyclosporine and 

prednisone.


-Obtain 24-hour urine creatinine and protein.


-Follow up urine cultures


-Continue IV fluid hydration


-Nephrology consulted, appreciate further recommendations


-Clear liquid diet pending further recommendations from nephrology.


-Symptomatically and pain management, anti-emetics as needed








CODE STATUS: Full Code


DVT prophylaxis: Heparin


Discussed with: Patient and RN


Anticipated discharge date: Clinical course to determine


Anticipated discharge place: Home





I reviewed the documentation as provided by the FAUSTO above, who is the original 

author of this note.  I agree with the documented assessment and plan, with the 

following changes: none





Objective





- Vital Signs


Vital signs: 


                                   Vital Signs











Temp  98.1 F   05/12/22 07:39


 


Pulse  90   05/12/22 07:39


 


Resp  18   05/12/22 07:39


 


BP  119/78   05/12/22 07:39


 


Pulse Ox  100   05/12/22 07:39








                                 Intake & Output











 05/11/22 05/12/22 05/12/22





 18:59 06:59 18:59


 


Weight 86.183 kg 86.183 kg 














- Labs


CBC & Chem 7: 


                                 05/11/22 19:36





                                 05/12/22 11:39


Labs: 


                  Abnormal Lab Results - Last 24 Hours (Table)











  05/11/22 05/11/22 05/11/22 Range/Units





  18:20 19:15 19:36 


 


WBC    14.4 H  (3.8-10.6)  k/uL


 


RBC    6.36 H  (4.30-5.90)  m/uL


 


Hgb    18.2 H  (13.0-17.5)  gm/dL


 


Hct    55.0 H  (39.0-53.0)  %


 


Neutrophils #    12.9 H  (1.3-7.7)  k/uL


 


Lymphocytes #    0.8 L  (1.0-4.8)  k/uL


 


Sodium   129 L   (137-145)  mmol/L


 


Creatinine   1.36 H   (0.66-1.25)  mg/dL


 


Glucose   113 H   (74-99)  mg/dL


 


Calcium   7.2 L   (8.4-10.2)  mg/dL


 


Alkaline Phosphatase   129 H   ()  U/L


 


Total Protein   4.8 L   (6.3-8.2)  g/dL


 


Albumin   1.8 L   (3.5-5.0)  g/dL


 


Ur Specific Gravity  >1.050 H    (1.001-1.035)  


 


Urine Protein  4+ H    (Negative)  


 


Urine Ketones  Trace H    (Negative)  


 


Urine Blood  Moderate H    (Negative)  


 


Urine WBC  31 H    (0-5)  /hpf








                      Microbiology - Last 24 Hours (Table)











 05/11/22 18:20 Urine Culture - Preliminary





 Urine,Voided

## 2022-05-12 NOTE — P.HPIM
History of Present Illness


H&P Date: 05/11/22





The patient is a 21-year-old male with a PMH of nephritic syndrome (follows with

Dr. Trevino) on prednisone chronically, and mild intermittent asthma who presents

to the emergency room with complaints of nausea and vomiting.  The patient 

reports that he had not been feeling well over the past 2-3 days, and felt 

fatigued and dehydrated.  The patient went to an urgent care center earlier 

today where a UA revealed protein, ketones, and blood, and the patient was 

subsequently referred to the emergency room.  He reports ongoing nausea, 

slightly improved from his presentation.  Reports that over the past 2-3 days, 

he has been urinating significantly less than usual, and that his urine is 

darker, almost brown in color.  He also states that he had missed several doses 

of his prednisone.  He denies experiencing abdominal pain, diarrhea, dysuria.  

Also denied chest discomfort, fever, chills, cough.  Laboratory evaluation in 

the emergency room revealed creatinine 1.36 (baseline 0.9), WBC count 14.4, 

sodium 129, albumin 1.8.  UA revealed ketones, moderate blood, and 4+ protein.





Review of systems:


Pertinent positives and negatives as discussed in HPI, a complete review of 

systems was performed and all other systems are negative.





Physical examination:


General: non toxic, no distress, appears at stated age, normal weight


Derm: no unusual rashes/lesions no unusual ecchymoses, warm, dry


Head: atraumatic, normocephalic, symmetric


Eyes: EOMI, no lid lag, anicteric sclera, pupils equal round reactive to light


ENT: Nose and ears atraumatic, no thrush,  no pharyngeal erythema


Neck: No thyromegaly, no cervical lymphadenopathy, trachea midline, supple


Mouth: no lip lesion, mucus membranes moist


Cardiovascular: S1S2 reg, no murmur, positive posterior tibial pulse bilateral, 

no edema, capillary refill less than 2 seconds


Lungs: CTA bilateral, no rhonchi, no rales , no accessory muscle use


Abdominal: soft,  nontender to palpation, no guarding, no appreciable 

organomegaly, normal bowel sounds


Ext: no gross muscle atrophy,  muscle strength 5 out of 5 in all 4 extremities 

grossly, no contractures, 


Neuro:  CN II-XI grossly intact, light touch intact all 4 extremities, finger to

nose within normal limits,


Psych: Alert, oriented, appropriate affect 





Assessment/plan





Nephritic syndrome with hypoalbuminemia


-Dr. Trevino discussed the case with the emergency room providers and Solu-

Medrol, cyclosporine, and prednisone were ordered


-Obtain 24-hour urine creatinine and protein.


-Follow up urine cultures


-Gentle IV hydration


-Nephrology consulted





DVT prophylaxis


-Heparin subcu





The patient is admitted with an anticipated greater than 2 midnight stay for 

evaluation of nephritic syndrome


CODE STATUS: Full Code


Discussed with: Patient


Anticipated discharge date: 5/14


Anticipated discharge place: Home








Past Medical History


Past Medical History: Asthma, Renal Disease


Additional Past Medical History / Comment(s): nephrotic syndrome, asthma as a 

child


History of Any Multi-Drug Resistant Organisms: None Reported


Past Surgical History: No Surgical Hx Reported


Additional Past Surgical History / Comment(s): L renal parenchymal biopsy


Past Anesthesia/Blood Transfusion Reactions: Unable to Obtain


Additional Past Anesthesia/Blood Transfusion Reaction / Comment(s): Pt has never

had surgery


Past Psychological History: No Psychological Hx Reported


Smoking Status: Never smoker


Past Alcohol Use History: None Reported


Past Drug Use History: None Reported





- Past Family History


  ** Mother


Additional Family Medical History / Comment(s): Mother has motion sickness.  Pt 

cannot recall any other hx.





  ** Father


History Unknown: Yes





Medications and Allergies


                                Home Medications











 Medication  Instructions  Recorded  Confirmed  Type


 


No Known Home Medications  05/11/22 05/11/22 History








                                    Allergies











Allergy/AdvReac Type Severity Reaction Status Date / Time


 


Penicillins Allergy  Family Verified 05/11/22 17:19





   History  














Physical Exam


Vitals: 


                                   Vital Signs











  Temp Pulse Pulse Resp BP BP Pulse Ox


 


 05/11/22 23:04  97.8 F   97  16   149/77  96


 


 05/11/22 21:40   92   14  121/83   100


 


 05/11/22 19:32   94   16  123/91   98


 


 05/11/22 15:09  97.8 F  91   16  115/89   97








                                Intake and Output











 05/11/22 05/11/22 05/12/22





 14:59 22:59 06:59


 


Other:   


 


  Weight  86.183 kg 86.183 kg














Results


CBC & Chem 7: 


                                 05/11/22 19:36





                                 05/11/22 19:15


Labs: 


                  Abnormal Lab Results - Last 24 Hours (Table)











  05/11/22 05/11/22 05/11/22 Range/Units





  18:20 19:15 19:36 


 


WBC    14.4 H  (3.8-10.6)  k/uL


 


RBC    6.36 H  (4.30-5.90)  m/uL


 


Hgb    18.2 H  (13.0-17.5)  gm/dL


 


Hct    55.0 H  (39.0-53.0)  %


 


Neutrophils #    12.9 H  (1.3-7.7)  k/uL


 


Lymphocytes #    0.8 L  (1.0-4.8)  k/uL


 


Sodium   129 L   (137-145)  mmol/L


 


Creatinine   1.36 H   (0.66-1.25)  mg/dL


 


Glucose   113 H   (74-99)  mg/dL


 


Calcium   7.2 L   (8.4-10.2)  mg/dL


 


Alkaline Phosphatase   129 H   ()  U/L


 


Total Protein   4.8 L   (6.3-8.2)  g/dL


 


Albumin   1.8 L   (3.5-5.0)  g/dL


 


Ur Specific Gravity  >1.050 H    (1.001-1.035)  


 


Urine Protein  4+ H    (Negative)  


 


Urine Ketones  Trace H    (Negative)  


 


Urine Blood  Moderate H    (Negative)  


 


Urine WBC  31 H    (0-5)  /hpf








                      Microbiology - Last 24 Hours (Table)











 05/11/22 18:20 Urine Culture - Preliminary





 Urine,Voided 














Thrombosis Risk Factor Assmnt





- Choose All That Apply


Any of the Below Risk Factors Present?: No


Other Risk Factors: No


Other congenital or acquired thrombophilia - If yes, enter type in comment: No


Thrombosis Risk Factor Assessment Level: Very Low Risk

## 2022-05-13 LAB
ANION GAP SERPL CALC-SCNC: 2 MMOL/L
BUN SERPL-SCNC: 45 MG/DL (ref 9–20)
CALCIUM SPEC-MCNC: 7 MG/DL (ref 8.4–10.2)
CHLORIDE SERPL-SCNC: 101 MMOL/L (ref 98–107)
CO2 SERPL-SCNC: 24 MMOL/L (ref 22–30)
CREAT 24H UR-MCNC: 1447.9 MG/24HR (ref 1000–2000)
GLUCOSE SERPL-MCNC: 98 MG/DL (ref 74–99)
POTASSIUM SERPL-SCNC: 4.6 MMOL/L (ref 3.5–5.1)
SODIUM SERPL-SCNC: 127 MMOL/L (ref 137–145)
SPECIMEN VOL 24H UR: 325 MLS (ref 800–1800)
SPECIMEN VOL 24H UR: 325 MLS (ref 800–1800)

## 2022-05-13 RX ADMIN — PANTOPRAZOLE SODIUM SCH MG: 40 INJECTION, POWDER, FOR SOLUTION INTRAVENOUS at 08:59

## 2022-05-13 RX ADMIN — PANTOPRAZOLE SODIUM SCH MG: 40 INJECTION, POWDER, FOR SOLUTION INTRAVENOUS at 08:46

## 2022-05-13 RX ADMIN — HEPARIN SODIUM SCH UNIT: 5000 INJECTION INTRAVENOUS; SUBCUTANEOUS at 16:28

## 2022-05-13 RX ADMIN — HEPARIN SODIUM SCH UNIT: 5000 INJECTION INTRAVENOUS; SUBCUTANEOUS at 08:45

## 2022-05-13 RX ADMIN — ONDANSETRON PRN MG: 2 INJECTION INTRAMUSCULAR; INTRAVENOUS at 09:56

## 2022-05-13 RX ADMIN — CEFAZOLIN SCH: 330 INJECTION, POWDER, FOR SOLUTION INTRAMUSCULAR; INTRAVENOUS at 06:06

## 2022-05-13 NOTE — US
EXAMINATION TYPE: US kidneys/renal and bladder

 

DATE OF EXAM: 5/13/2022

 

COMPARISON: CT

 

CLINICAL HISTORY: ALEXIS. ALEXIS. Patient has hx of left renal biopsy.

 

EXAM MEASUREMENTS:

 

Right Kidney:  12.8 x 6.9 x 4.8 cm

Left Kidney: 11.8 x 6.6 x 6.6 cm

 

 

Right Kidney: Measures upper limits versus minimally enlarged. No hydronephrosis or masses seen  

Left Kidney: Area that appears to be isoechoic to the spleen seen within the LUQ between the spleen a
nd left kidney measuring 1.7 x 1.5 x 1.5 cm. 

Compatible with a splenule.

Bladder: Appears anechoic. Slightly limited evaluation- bladder not fully distended.

**Bilateral Jets seen: Only right jet was visualized at this time.

 

*Free fluid seen superior to the bladder.

 

 

 

IMPRESSION:

1. No distinct abnormality appreciated with certainty.

## 2022-05-13 NOTE — P.PN
Subjective


Progress Note Date: 05/13/22


Principal diagnosis: 





Swelling





Doing well but had worsened upper extremities swelling. No sob, no pain. No 

fevers.





Objective





- Vital Signs


Vital signs: 


                                   Vital Signs











Temp  97.7 F   05/13/22 14:00


 


Pulse  78   05/13/22 14:00


 


Resp  18   05/13/22 14:00


 


BP  121/73   05/13/22 14:00


 


Pulse Ox  99   05/13/22 14:00








                                 Intake & Output











 05/12/22 05/13/22 05/13/22





 18:59 06:59 18:59


 


Other:   


 


  Voiding Method   Urinal


 


  # Voids 2 3 














- Exam





Constitutional: No acute distress, conversant, pleasant


Eyes:Anicteric sclerae, moist conjunctiva, no lid-lag, PERRLA, 


ENMT: Oropharynx clear, no erythema, exudates


Neck: Supple, FROM, no masses, or JVD, No carotid bruits, No thyromegaly


Lungs: Clear to auscultation, Clear to percussion, Normal respiratory effort, no

accessory muscle use 


Cardiovascular: Heart regular in rate and rhythm, No murmurs, gallops, or rubs, 

diffuse anasarca


Abdominal: Soft, Nontender, no guarding, rebound or rigidity, Normoactive bowel 

sounds, No hepatomegaly, No splenomegaly, No palpable mass 


Skin: Normal temperature, tone, texture, turgor, no induration, No subcutaneous 

nodules, No rash, lesions, No ulcers


Extremities: No digital cyanosis, No clubbing, Pedal pulses intact and 

symmetrical, Radial pulses intact and symmetrical, No calf tenderness 


Psychiatric: Alert and oriented to person, place and time, appropriate affect, 

intact judgement         


Neuro: Muscles Strength 5/5 in all 4 extremities, Sensation to light touch 

grossly present throughout, Cranial nerves II-XII grossly intact, no focal 

sensory deficits








- Labs


CBC & Chem 7: 


                                 05/11/22 19:36





                                 05/13/22 09:02


Labs: 


                  Abnormal Lab Results - Last 24 Hours (Table)











  05/12/22 05/12/22 05/12/22 Range/Units





  09:30 09:30 11:39 


 


Sodium     (137-145)  mmol/L


 


BUN     (9-20)  mg/dL


 


Creatinine     (0.66-1.25)  mg/dL


 


Calcium     (8.4-10.2)  mg/dL


 


Ur 24 Hour Volume  325 L  325 L   (800-1800)  mls


 


Cyclosporine    27 L  (100-400)  ng/mL














  05/12/22 05/13/22 Range/Units





  17:17 09:02 


 


Sodium  126 L  127 L  (137-145)  mmol/L


 


BUN   45 H  (9-20)  mg/dL


 


Creatinine   2.03 H  (0.66-1.25)  mg/dL


 


Calcium   7.0 L  (8.4-10.2)  mg/dL


 


Ur 24 Hour Volume    (800-1800)  mls


 


Cyclosporine    (100-400)  ng/mL








                      Microbiology - Last 24 Hours (Table)











 05/11/22 18:20 Urine Culture - Final





 Urine,Voided 














Assessment and Plan


Plan: 





Nephritic syndrome with hypoalbuminemia


Hyponatremia


Acute kidney injury


Leukocytosis


-Patient received dose of Solu-Medrol and to continue with cyclosporine and 

prednisone.


-Urine showing high amount of protein, lab unable to calculate.


-Follow up urine cultures


-Followed by nephro who discontinued IV fluids and gave one dose of lasix on 

5/13


-Obtain renal U/s


-Follow cr and Na in am








CODE STATUS: Full Code


DVT prophylaxis: Heparin


Discussed with: Patient and RN


Anticipated discharge date: Clinical course to determine


Anticipated discharge place: Home

## 2022-05-13 NOTE — P.PN
Subjective





Patient is seen for follow-up for acute kidney injury and underlying minimal-

change disease and nephrotic syndrome.  Patient is currently in relapse.


He has ongoing nausea and vomiting.  Patient was started on Protonix yesterday.





Prednisone has been increased to 30 mg daily.


Sodium has been low patient was given a normal saline challenge yesterday and 

his sodium dropped further.  This is now discontinued.





This morning patient is complaining of increased swelling in his upper 

extremities.  He denies any significant shortness of breath.





Objective





- Vital Signs


Vital signs: 


                                   Vital Signs











Temp  98.0 F   05/13/22 08:00


 


Pulse  74   05/13/22 08:00


 


Resp  16   05/13/22 08:00


 


BP  129/80   05/13/22 08:00


 


Pulse Ox  99   05/13/22 08:00








                                 Intake & Output











 05/12/22 05/13/22 05/13/22





 18:59 06:59 18:59


 


Other:   


 


  Voiding Method   Urinal


 


  # Voids 2 3 














- Exam





Patient is awake, comfortable, not in any acute distress


Examination of the heart S1 and S2


Examination of lungs bilateral breath sounds are heard


Abdomen is soft nontender


Exertion lower extremity shows trace edema bilaterally


CNS exam grossly intact





- Labs


CBC & Chem 7: 


                                 05/11/22 19:36





                                 05/13/22 09:02


Labs: 


                  Abnormal Lab Results - Last 24 Hours (Table)











  05/12/22 05/12/22 05/12/22 Range/Units





  11:39 11:39 17:17 


 


Sodium   128 L  126 L  (137-145)  mmol/L


 


BUN   27 H   (9-20)  mg/dL


 


Creatinine   1.71 H   (0.66-1.25)  mg/dL


 


Glucose   121 H   (74-99)  mg/dL


 


Calcium   7.1 L   (8.4-10.2)  mg/dL


 


Total Protein   4.6 L   (6.3-8.2)  g/dL


 


Albumin   1.7 L   (3.5-5.0)  g/dL


 


Cyclosporine  27 L    (100-400)  ng/mL














  05/13/22 Range/Units





  09:02 


 


Sodium  127 L  (137-145)  mmol/L


 


BUN  45 H  (9-20)  mg/dL


 


Creatinine  2.03 H  (0.66-1.25)  mg/dL


 


Glucose   (74-99)  mg/dL


 


Calcium  7.0 L  (8.4-10.2)  mg/dL


 


Total Protein   (6.3-8.2)  g/dL


 


Albumin   (3.5-5.0)  g/dL


 


Cyclosporine   (100-400)  ng/mL








                      Microbiology - Last 24 Hours (Table)











 05/11/22 18:20 Urine Culture - Final





 Urine,Voided 














Assessment and Plan


Assessment: 





1.  Nephrotic syndrome with minimal change disease status post kidney biopsy in 

2020.  Patient had been in remission with last 24 hour urine protein of 103 mg 

in July 2021.


Patient currently has a relapse with significant proteinuria.  Etiology is 

likely noncompliance with medications.  Baseline creatinine around 0.9-1 mg/dL. 

Was maintained on low-dose prednisone and cyclosporine.


2.  Acute kidney injury associated with relapse of underlying GN. No improvement

with hydration. R/o retention.


3.  Nausea and vomiting possibly related to gastritis


4.  Hyponatremia, worsens with normal saline.  Currently hypervolemic.


Plan: 





Quantify proteinuria


D/c saline


IV lasix x1


Check USS kidneys


Continue prednisone.


Check bladder scan r/o retention.


Continue protonix.


Repeat labs in am.

## 2022-05-14 LAB
ANION GAP SERPL CALC-SCNC: 7.8 MMOL/L (ref 10–18)
BUN SERPL-SCNC: 50.6 MG/DL (ref 9–27)
BUN/CREAT SERPL: 26.63 RATIO (ref 12–20)
CALCIUM SPEC-MCNC: 7.3 MG/DL (ref 8.7–10.3)
CHLORIDE SERPL-SCNC: 99 MMOL/L (ref 96–109)
CO2 SERPL-SCNC: 22.5 MMOL/L (ref 20–27.5)
GLUCOSE SERPL-MCNC: 99 MG/DL (ref 70–110)
MAGNESIUM SPEC-SCNC: 2.6 MG/DL (ref 1.5–2.4)
POTASSIUM SERPL-SCNC: 4 MMOL/L (ref 3.5–5.5)
SODIUM SERPL-SCNC: 130 MMOL/L (ref 135–145)

## 2022-05-14 RX ADMIN — HEPARIN SODIUM SCH: 5000 INJECTION INTRAVENOUS; SUBCUTANEOUS at 16:58

## 2022-05-14 RX ADMIN — HEPARIN SODIUM SCH UNIT: 5000 INJECTION INTRAVENOUS; SUBCUTANEOUS at 07:59

## 2022-05-14 RX ADMIN — HEPARIN SODIUM SCH UNIT: 5000 INJECTION INTRAVENOUS; SUBCUTANEOUS at 00:38

## 2022-05-14 RX ADMIN — PANTOPRAZOLE SODIUM SCH MG: 40 INJECTION, POWDER, FOR SOLUTION INTRAVENOUS at 07:59

## 2022-05-14 NOTE — P.PN
Subjective





Patient is seen for follow-up for acute kidney injury and underlying minimal-

change disease and nephrotic syndrome.  Patient is currently in relapse.


He has ongoing nausea and vomiting.  Patient was started on Protonix this 

admission





Prednisone has been increased to 30 mg daily.


Sodium level had been low and worsened with normal saline currently improved 

with IV Lasix.





Sodium is improved to 130 today.  Serum creatinine at 1.9 mg/dL.  Patient states

he is voiding.  24-hour urine protein not resulted


Nausea is improved.





Objective





- Vital Signs


Vital signs: 


                                   Vital Signs











Temp  97.7 F   05/14/22 08:00


 


Pulse  61   05/14/22 08:00


 


Resp  18   05/14/22 08:00


 


BP  130/77   05/14/22 08:00


 


Pulse Ox  98   05/14/22 08:00








                                 Intake & Output











 05/13/22 05/14/22 05/14/22





 18:59 06:59 18:59


 


Intake Total 300  


 


Balance 300  


 


Intake:   


 


    


 


    Sodium Chloride 0.9% 1, 300  





    000 ml @ 75 mls/hr IV .   





    [id number] [facility] Rx#:[id number]   


 


Other:   


 


  Voiding Method Urinal Urinal Urinal


 


  # Voids  2 














- Exam





Patient is awake, comfortable, not in any acute distress


Examination of the heart S1 and S2


Examination of lungs bilateral breath sounds are heard


Abdomen is soft nontender


Exertion lower extremity shows trace edema bilaterally


CNS exam grossly intact





- Labs


CBC & Chem 7: 


                                 05/11/22 19:36





                                 05/14/22 05:37


Labs: 


                  Abnormal Lab Results - Last 24 Hours (Table)











  05/12/22 05/12/22 05/14/22 Range/Units





  09:30 09:30 05:37 


 


Sodium    130 L  (135-145)  mmol/L


 


Anion Gap    7.80 L  (10.00-18.00)  mmol/L


 


BUN    50.6 H  (9.0-27.0)  mg/dL


 


Creatinine    1.9 H  (0.6-1.5)  mg/dL


 


Est GFR (CKD-EPI)AfAm    57.1 L  (60.0-200.0)   


 


Est GFR (CKD-EPI)NonAf    49.3 L  (60.0-200.0)   


 


BUN/Creatinine Ratio    26.63 H  (12.00-20.00)  Ratio


 


Calcium    7.3 L  (8.7-10.3)  mg/dL


 


Magnesium    2.6 H  (1.5-2.4)  mg/dL


 


Ur 24 Hour Volume  325 L  325 L   (800-1800)  mls














Assessment and Plan


Assessment: 





1.  Nephrotic syndrome with minimal change disease status post kidney biopsy in 

2020.  Patient had been in remission with last 24 hour urine protein of 103 mg 

in July 2021.  24 hour urine for protein currently pending


Patient currently has a relapse with significant proteinuria.  Etiology is 

likely noncompliance with medications.  Baseline creatinine around 0.9-1 mg/dL. 

Was maintained on low-dose prednisone and cyclosporine.


2.  Acute kidney injury associated with relapse of underlying GN. No improvement

with hydration. R/o retention.


3.  Nausea and vomiting possibly related to gastritis


4.  Hyponatremia, worsens with normal saline.  Currently hypervolemic.  Improved

with Lasix


Plan: 





Repeat IV Lasix today


Check random urine protein creatinine ratio


Continue with prednisone

## 2022-05-14 NOTE — P.PN
Subjective


Progress Note Date: 05/14/22


Principal diagnosis: 





Swelling





He does not feel that the swelling is any better today compared to yesterday. No

sob or pain. No fevers.





Objective





- Vital Signs


Vital signs: 


                                   Vital Signs











Temp  97.7 F   05/14/22 08:00


 


Pulse  61   05/14/22 08:00


 


Resp  18   05/14/22 08:00


 


BP  130/77   05/14/22 08:00


 


Pulse Ox  98   05/14/22 08:00








                                 Intake & Output











 05/13/22 05/14/22 05/14/22





 18:59 06:59 18:59


 


Intake Total 300  


 


Balance 300  


 


Intake:   


 


    


 


    Sodium Chloride 0.9% 1, 300  





    000 ml @ 75 mls/hr IV .   





    [id number] [facility] Rx#:[id number]   


 


Other:   


 


  Voiding Method Urinal Urinal Urinal


 


  # Voids  2 














- Exam





Constitutional: No acute distress, conversant, pleasant


Eyes:Anicteric sclerae, moist conjunctiva, no lid-lag, PERRLA, 


ENMT: Oropharynx clear, no erythema, exudates


Neck: Supple, FROM, no masses, or JVD, No carotid bruits, No thyromegaly


Lungs: Clear to auscultation, Clear to percussion, Normal respiratory effort, no

accessory muscle use 


Cardiovascular: Heart regular in rate and rhythm, No murmurs, gallops, or rubs, 

diffuse anasarca


Abdominal: Soft, Nontender, no guarding, rebound or rigidity, Normoactive bowel 

sounds, No hepatomegaly, No splenomegaly, No palpable mass 


Skin: Normal temperature, tone, texture, turgor, no induration, No subcutaneous 

nodules, No rash, lesions, No ulcers


Extremities: No digital cyanosis, No clubbing, Pedal pulses intact and 

symmetrical, Radial pulses intact and symmetrical, No calf tenderness 


Psychiatric: Alert and oriented to person, place and time, appropriate affect, 

intact judgement         


Neuro: Muscles Strength 5/5 in all 4 extremities, Sensation to light touch 

grossly present throughout, Cranial nerves II-XII grossly intact, no focal 

sensory deficits








- Labs


CBC & Chem 7: 


                                 05/11/22 19:36





                                 05/14/22 05:37


Labs: 


                  Abnormal Lab Results - Last 24 Hours (Table)











  05/12/22 05/12/22 05/14/22 Range/Units





  09:30 09:30 05:37 


 


Sodium    130 L  (135-145)  mmol/L


 


Anion Gap    7.80 L  (10.00-18.00)  mmol/L


 


BUN    50.6 H  (9.0-27.0)  mg/dL


 


Creatinine    1.9 H  (0.6-1.5)  mg/dL


 


Est GFR (CKD-EPI)AfAm    57.1 L  (60.0-200.0)   


 


Est GFR (CKD-EPI)NonAf    49.3 L  (60.0-200.0)   


 


BUN/Creatinine Ratio    26.63 H  (12.00-20.00)  Ratio


 


Calcium    7.3 L  (8.7-10.3)  mg/dL


 


Magnesium    2.6 H  (1.5-2.4)  mg/dL


 


Ur 24 Hour Volume  325 L  325 L   (800-1800)  mls














Assessment and Plan


Plan: 





Nephrotic syndrome with hypoalbuminemia and protienuria likely sec to noncom

pliance with meds


Hyponatremia


Acute kidney injury


Leukocytosis


-Patient received dose of Solu-Medrol 


-Continue with cyclosporine and prednisone, increased dose to 30mg daily.


-Urine showing high amount of protein, lab unable to calculate.


-Na better


-Followed by nephro who discontinued IV fluids and gave one dose of lasix on 

5/13, and on 5/14


-Renal U/s ok








CODE STATUS: Full Code


DVT prophylaxis: Heparin


Discussed with: Patient and RN


Anticipated discharge date: Clinical course to determine


Anticipated discharge place: Home

## 2022-05-15 VITALS — DIASTOLIC BLOOD PRESSURE: 74 MMHG | HEART RATE: 74 BPM | SYSTOLIC BLOOD PRESSURE: 129 MMHG | TEMPERATURE: 98 F

## 2022-05-15 VITALS — RESPIRATION RATE: 17 BRPM

## 2022-05-15 LAB
ANION GAP SERPL CALC-SCNC: 9.3 MMOL/L (ref 10–18)
BUN SERPL-SCNC: 45.3 MG/DL (ref 9–27)
BUN/CREAT SERPL: 32.36 RATIO (ref 12–20)
CALCIUM SPEC-MCNC: 7.2 MG/DL (ref 8.7–10.3)
CHLORIDE SERPL-SCNC: 100 MMOL/L (ref 96–109)
CO2 SERPL-SCNC: 20.7 MMOL/L (ref 20–27.5)
GLUCOSE SERPL-MCNC: 118 MG/DL (ref 70–110)
POTASSIUM SERPL-SCNC: 4.5 MMOL/L (ref 3.5–5.5)
SODIUM SERPL-SCNC: 130 MMOL/L (ref 135–145)

## 2022-05-15 RX ADMIN — HEPARIN SODIUM SCH UNIT: 5000 INJECTION INTRAVENOUS; SUBCUTANEOUS at 08:55

## 2022-05-15 RX ADMIN — PANTOPRAZOLE SODIUM SCH MG: 40 INJECTION, POWDER, FOR SOLUTION INTRAVENOUS at 09:04

## 2022-05-15 RX ADMIN — HEPARIN SODIUM SCH UNIT: 5000 INJECTION INTRAVENOUS; SUBCUTANEOUS at 00:18

## 2022-05-15 RX ADMIN — HEPARIN SODIUM SCH: 5000 INJECTION INTRAVENOUS; SUBCUTANEOUS at 15:06

## 2022-05-15 NOTE — P.PN
Subjective





Patient is seen for follow-up for acute kidney injury and underlying minimal-

change disease and nephrotic syndrome.  Patient is currently in relapse.


He has ongoing nausea and vomiting.  Patient was started on Protonix this 

admission





Prednisone has been increased to 30 mg daily.


Sodium level had been low and worsened with normal saline currently improved 

with IV Lasix.





Sodium is improved to 130 today.  Serum creatinine is down to 1.4 mg/dL.  

Patient states he is voiding.  24-hour urine protein not resulted


Nausea is improved.





Objective





- Vital Signs


Vital signs: 


                                   Vital Signs











Temp  97.6 F   05/15/22 08:00


 


Pulse  68   05/15/22 08:00


 


Resp  17   05/15/22 01:58


 


BP  138/75   05/15/22 08:00


 


Pulse Ox  99   05/15/22 08:00








                                 Intake & Output











 05/14/22 05/15/22 05/15/22





 18:59 06:59 18:59


 


Intake Total 1080  


 


Balance 1080  


 


Intake:   


 


  Oral 1080  


 


Other:   


 


  Voiding Method Urinal Urinal 


 


  # Voids 3 2 














- Exam





Patient is awake, comfortable, not in any acute distress


Examination of the heart S1 and S2


Examination of lungs bilateral breath sounds are heard


Abdomen is soft nontender


Exertion lower extremity shows trace edema bilaterally


CNS exam grossly intact





- Labs


CBC & Chem 7: 


                                 05/11/22 19:36





                                 05/15/22 04:12


Labs: 


                  Abnormal Lab Results - Last 24 Hours (Table)











  05/15/22 Range/Units





  04:12 


 


Sodium  130 L  (135-145)  mmol/L


 


Anion Gap  9.30 L  (10.00-18.00)  mmol/L


 


BUN  45.3 H  (9.0-27.0)  mg/dL


 


BUN/Creatinine Ratio  32.36 H  (12.00-20.00)  Ratio


 


Glucose  118 H  ()  mg/dL


 


Calcium  7.2 L  (8.7-10.3)  mg/dL














Assessment and Plan


Assessment: 





1.  Nephrotic syndrome with minimal change disease status post kidney biopsy in 

2020.  Patient had been in remission with last 24 hour urine protein of 103 mg 

in July 2021.  24 hour urine for protein currently pending


Patient currently has a relapse with significant proteinuria.  Etiology is 

likely noncompliance with medications.  Baseline creatinine around 0.9-1 mg/dL. 

Was maintained on low-dose prednisone and cyclosporine.


2.  Acute kidney injury associated with relapse of underlying GN. No improvement

with hydration.  Reacting improved today to 1.4 mg/dL


3.  Nausea and vomiting possibly related to gastritis, maintained on proton pump

inhibitors and improved


4.  Hyponatremia, worsens with normal saline.  Currently hypervolemic.  Improved

with Lasix.  Sodium 1:30 today


Plan: 





Repeat the dose of IV Lasix today.  


Patient can be discharged from nephrology standpoint


Continue with proton pump inhibitors and current dose of prednisone and follow-

up in the office in about 1 week's time

## 2022-05-15 NOTE — P.DS
Providers


Date of admission: 


05/11/22 20:21





Expected date of discharge: 05/15/22


Attending physician: 


Roro Lester MD





Consults: 





                                        





05/11/22 20:31


Consult Physician Routine 


   Consulting Provider: Moiz Trevino


   Consult Reason/Comments: Nephrotic syndrome


   Do you want consulting provider notified?: Already Contacted











Primary care physician: 


Stated None





Hospital Course: 





21-year-old male with a PMH of nephrotic syndrome (follows with Dr. Trevino) on 

prednisone chronically, and mild intermittent asthma who presents to the 

emergency room with complaints of nausea and vomiting.  The patient reports that

he had not been feeling well over the past 2-3 days, and felt fatigued and 

dehydrated.  The patient went to an urgent care center where a UA revealed 

protein, ketones, and blood, and the patient was subsequently referred to the 

emergency room.  He reports ongoing nausea, slightly improved from his 

presentation.  Reports that over the past 2-3 days, he has been urinating 

significantly less than usual, and that his urine is darker, almost brown in 

color.  He also states that he had missed several doses of his prednisone.  He 

denies experiencing abdominal pain, diarrhea, dysuria.  Also denied chest 

discomfort, fever, chills, cough.  





Laboratory evaluation in the emergency room revealed creatinine 1.36 (baseline 

0.9), WBC count 14.4, sodium 129, albumin 1.8.  UA revealed ketones, moderate 

blood, and 4+ protein.  On examination he had diffuse anasarca with 2-3+ edema 

in all extremities.





Upon admission he was started on IV fluids but upon doing that his creatinine 

worsened to a peak of 2 and his sodium worsened to 126.  He was seen by 

nephrology  who stopped IV fluids and started on Lasix.  Meanwhile prednisone 30

mg daily and cyclosporine were resumed.  Cyclosporine level was low at 27 

consistent with noncompliance.  He did have significant proteinuria on 

quantitative protein analysis in the urine.  With all of the above interventions

the swelling improved as well as a renal function.  On the day of discharge came

back to normal at 1.4.  Patient will be resumed on prednisone 30 mg daily and 

cyclosporine upon discharge.  He was instructed to be compliant with the 

medication.  He was also told to follow-up with the nephrologist in the office 

in one week.





Time for discharge 36 min











Plan - Discharge Summary


Discharge Rx Participant: No


New Discharge Prescriptions: 


New


   predniSONE 30 mg PO DAILY 15 Days #15 tab


   cycloSPORINE [SandIMMUNE] 200 mg PO BID 30 Days #60 cap


Discharge Medication List





cycloSPORINE [SandIMMUNE] 200 mg PO BID 30 Days #60 cap 05/15/22 [Rx]


predniSONE 30 mg PO DAILY 15 Days #15 tab 05/15/22 [Rx]








Follow up Appointment(s)/Referral(s): 


Anahi Gutierrez MD [STAFF PHYSICIAN] - 1 Week (PLEASE CALL OFFICE ON MONDAY TO 

SCHEDULE APPOINTMENT)


None,Stated [Primary Care Provider] - 1-2 days (PLEASE ESTABLISH WITH PRIMARY 

CARE PROVIDER )

## 2022-07-07 ENCOUNTER — HOSPITAL ENCOUNTER (OUTPATIENT)
Dept: HOSPITAL 47 - LABWHC1 | Age: 22
Discharge: HOME | End: 2022-07-07
Attending: INTERNAL MEDICINE
Payer: COMMERCIAL

## 2022-07-07 DIAGNOSIS — N05.0: Primary | ICD-10-CM

## 2022-07-07 PROCEDURE — 80158 DRUG ASSAY CYCLOSPORINE: CPT

## 2022-07-07 PROCEDURE — 36415 COLL VENOUS BLD VENIPUNCTURE: CPT

## 2022-08-31 ENCOUNTER — HOSPITAL ENCOUNTER (OUTPATIENT)
Dept: HOSPITAL 47 - LABWHC1 | Age: 22
Discharge: HOME | End: 2022-08-31
Attending: INTERNAL MEDICINE
Payer: COMMERCIAL

## 2022-08-31 DIAGNOSIS — N18.1: Primary | ICD-10-CM

## 2022-08-31 LAB
ALBUMIN SERPL-MCNC: 1.7 G/DL (ref 3.8–4.9)
ALBUMIN/GLOB SERPL: 0.65 G/DL (ref 1.6–3.17)
ALP SERPL-CCNC: 80 U/L (ref 41–126)
ALT SERPL-CCNC: 10 U/L (ref 10–49)
ANION GAP SERPL CALC-SCNC: 9 MMOL/L (ref 10–18)
AST SERPL-CCNC: 17 U/L (ref 14–35)
BASOPHILS # BLD AUTO: 0.03 X 10*3/UL (ref 0–0.1)
BASOPHILS NFR BLD AUTO: 0.3 %
BUN SERPL-SCNC: 36 MG/DL (ref 9–27)
BUN/CREAT SERPL: 11.32 RATIO (ref 12–20)
CALCIUM SPEC-MCNC: 7.6 MG/DL (ref 8.7–10.3)
CHLORIDE SERPL-SCNC: 98 MMOL/L (ref 96–109)
CO2 SERPL-SCNC: 21.9 MMOL/L (ref 20–27.5)
EOSINOPHIL # BLD AUTO: 0.09 X 10*3/UL (ref 0.04–0.35)
EOSINOPHIL NFR BLD AUTO: 1 %
ERYTHROCYTE [DISTWIDTH] IN BLOOD BY AUTOMATED COUNT: 5.8 X 10*6/UL (ref 4.4–5.6)
ERYTHROCYTE [DISTWIDTH] IN BLOOD: 13 % (ref 11.5–14.5)
GLOBULIN SER CALC-MCNC: 2.6 G/DL (ref 1.6–3.3)
GLUCOSE SERPL-MCNC: 98 MG/DL (ref 70–110)
GRAN CASTS UR QL COMP ASSIST: 25 /LPF
HCT VFR BLD AUTO: 48.3 % (ref 39.6–50)
HGB BLD-MCNC: 16.5 G/DL (ref 13–17)
HYALINE CASTS UR QL AUTO: 3 /LPF (ref 0–2)
IMM GRANULOCYTES BLD QL AUTO: 0.5 %
LYMPHOCYTES # SPEC AUTO: 1.37 X 10*3/UL (ref 0.9–5)
LYMPHOCYTES NFR SPEC AUTO: 15.5 %
MAGNESIUM SPEC-SCNC: 2.3 MG/DL (ref 1.5–2.4)
MCH RBC QN AUTO: 28.4 PG (ref 27–32)
MCHC RBC AUTO-ENTMCNC: 34.2 G/DL (ref 32–37)
MCV RBC AUTO: 83.3 FL (ref 80–97)
MIXED CELL CASTS UR QL COMP ASSIST: 3 /LPF
MONOCYTES # BLD AUTO: 0.81 X 10*3/UL (ref 0.2–1)
MONOCYTES NFR BLD AUTO: 9.2 %
NEUTROPHILS # BLD AUTO: 6.5 X 10*3/UL (ref 1.8–7.7)
NEUTROPHILS NFR BLD AUTO: 73.5 %
NRBC BLD AUTO-RTO: 0 /100 WBCS (ref 0–0)
PH UR: 6.5 [PH] (ref 5–8)
PLATELET # BLD AUTO: 339 X 10*3/UL (ref 140–440)
POTASSIUM SERPL-SCNC: 4 MMOL/L (ref 3.5–5.5)
PROT SERPL-MCNC: 4.3 G/DL (ref 6.2–8.2)
PROT UR QL: (no result)
RBC UR QL: 2 /HPF (ref 0–5)
SODIUM SERPL-SCNC: 129 MMOL/L (ref 135–145)
SP GR UR: 1.04 (ref 1–1.03)
UROBILINOGEN UR QL STRIP: <2 MG/DL (ref ?–2)
WBC # BLD AUTO: 8.84 X 10*3/UL (ref 4.5–10)
WBC # UR AUTO: 11 /HPF (ref 0–5)

## 2022-08-31 PROCEDURE — 87086 URINE CULTURE/COLONY COUNT: CPT

## 2022-08-31 PROCEDURE — 36415 COLL VENOUS BLD VENIPUNCTURE: CPT

## 2022-08-31 PROCEDURE — 83735 ASSAY OF MAGNESIUM: CPT

## 2022-08-31 PROCEDURE — 84100 ASSAY OF PHOSPHORUS: CPT

## 2022-08-31 PROCEDURE — 85025 COMPLETE CBC W/AUTO DIFF WBC: CPT

## 2022-08-31 PROCEDURE — 80158 DRUG ASSAY CYCLOSPORINE: CPT

## 2022-08-31 PROCEDURE — 82306 VITAMIN D 25 HYDROXY: CPT

## 2022-08-31 PROCEDURE — 81001 URINALYSIS AUTO W/SCOPE: CPT

## 2022-08-31 PROCEDURE — 80053 COMPREHEN METABOLIC PANEL: CPT

## 2022-09-02 ENCOUNTER — HOSPITAL ENCOUNTER (INPATIENT)
Dept: HOSPITAL 47 - EC | Age: 22
LOS: 3 days | Discharge: HOME | DRG: 683 | End: 2022-09-05
Attending: INTERNAL MEDICINE | Admitting: INTERNAL MEDICINE
Payer: COMMERCIAL

## 2022-09-02 DIAGNOSIS — Z91.14: ICD-10-CM

## 2022-09-02 DIAGNOSIS — Z91.19: ICD-10-CM

## 2022-09-02 DIAGNOSIS — J45.909: ICD-10-CM

## 2022-09-02 DIAGNOSIS — K29.70: ICD-10-CM

## 2022-09-02 DIAGNOSIS — N04.9: ICD-10-CM

## 2022-09-02 DIAGNOSIS — Z87.09: ICD-10-CM

## 2022-09-02 DIAGNOSIS — Z87.441: ICD-10-CM

## 2022-09-02 DIAGNOSIS — Z88.0: ICD-10-CM

## 2022-09-02 DIAGNOSIS — Z79.52: ICD-10-CM

## 2022-09-02 DIAGNOSIS — E87.1: ICD-10-CM

## 2022-09-02 DIAGNOSIS — N17.9: Primary | ICD-10-CM

## 2022-09-02 DIAGNOSIS — T45.1X6A: ICD-10-CM

## 2022-09-02 LAB
ALBUMIN SERPL-MCNC: 2 G/DL (ref 3.5–5)
ALP SERPL-CCNC: 92 U/L (ref 38–126)
ALT SERPL-CCNC: 11 U/L (ref 4–49)
ANION GAP SERPL CALC-SCNC: 4 MMOL/L
AST SERPL-CCNC: 22 U/L (ref 17–59)
BASOPHILS # BLD AUTO: 0.1 K/UL (ref 0–0.2)
BASOPHILS NFR BLD AUTO: 1 %
BUN SERPL-SCNC: 47 MG/DL (ref 9–20)
CALCIUM SPEC-MCNC: 7 MG/DL (ref 8.4–10.2)
CHLORIDE SERPL-SCNC: 100 MMOL/L (ref 98–107)
CO2 SERPL-SCNC: 25 MMOL/L (ref 22–30)
EOSINOPHIL # BLD AUTO: 0.2 K/UL (ref 0–0.7)
EOSINOPHIL NFR BLD AUTO: 2 %
ERYTHROCYTE [DISTWIDTH] IN BLOOD BY AUTOMATED COUNT: 5.82 M/UL (ref 4.3–5.9)
ERYTHROCYTE [DISTWIDTH] IN BLOOD: 12.8 % (ref 11.5–15.5)
GLUCOSE SERPL-MCNC: 100 MG/DL (ref 74–99)
GRAN CASTS UR QL COMP ASSIST: 1 /LPF
HCT VFR BLD AUTO: 49.4 % (ref 39–53)
HGB BLD-MCNC: 16.1 GM/DL (ref 13–17.5)
LYMPHOCYTES # SPEC AUTO: 1.3 K/UL (ref 1–4.8)
LYMPHOCYTES NFR SPEC AUTO: 14 %
MCH RBC QN AUTO: 27.7 PG (ref 25–35)
MCHC RBC AUTO-ENTMCNC: 32.7 G/DL (ref 31–37)
MCV RBC AUTO: 84.8 FL (ref 80–100)
MONOCYTES # BLD AUTO: 0.5 K/UL (ref 0–1)
MONOCYTES NFR BLD AUTO: 6 %
NEUTROPHILS # BLD AUTO: 7.1 K/UL (ref 1.3–7.7)
NEUTROPHILS NFR BLD AUTO: 76 %
PH UR: 6.5 [PH] (ref 5–8)
PLATELET # BLD AUTO: 348 K/UL (ref 150–450)
POTASSIUM SERPL-SCNC: 3.8 MMOL/L (ref 3.5–5.1)
PROT SERPL-MCNC: 4.3 G/DL (ref 6.3–8.2)
PROT UR QL: (no result)
RBC UR QL: 3 /HPF (ref 0–5)
SODIUM SERPL-SCNC: 129 MMOL/L (ref 137–145)
SP GR UR: 1.03 (ref 1–1.03)
UROBILINOGEN UR QL STRIP: <2 MG/DL (ref ?–2)
WBC # BLD AUTO: 9.3 K/UL (ref 3.8–10.6)
WBC #/AREA URNS HPF: 12 /HPF (ref 0–5)

## 2022-09-02 PROCEDURE — 96374 THER/PROPH/DIAG INJ IV PUSH: CPT

## 2022-09-02 PROCEDURE — 85025 COMPLETE CBC W/AUTO DIFF WBC: CPT

## 2022-09-02 PROCEDURE — 80158 DRUG ASSAY CYCLOSPORINE: CPT

## 2022-09-02 PROCEDURE — 99284 EMERGENCY DEPT VISIT MOD MDM: CPT

## 2022-09-02 PROCEDURE — 81050 URINALYSIS VOLUME MEASURE: CPT

## 2022-09-02 PROCEDURE — 84156 ASSAY OF PROTEIN URINE: CPT

## 2022-09-02 PROCEDURE — 36415 COLL VENOUS BLD VENIPUNCTURE: CPT

## 2022-09-02 PROCEDURE — 96361 HYDRATE IV INFUSION ADD-ON: CPT

## 2022-09-02 PROCEDURE — 80053 COMPREHEN METABOLIC PANEL: CPT

## 2022-09-02 PROCEDURE — 81001 URINALYSIS AUTO W/SCOPE: CPT

## 2022-09-02 PROCEDURE — 80048 BASIC METABOLIC PNL TOTAL CA: CPT

## 2022-09-02 NOTE — ED
General Adult HPI





- General


Chief complaint: Recheck/Abnormal Lab/Rx


Stated complaint: Irregular labs


Time Seen by Provider: 09/02/22 17:26


Source: patient


Mode of arrival: ambulatory


Limitations: no limitations





- History of Present Illness


Initial comments: 


Patient is a 22-year-old male with a past medical history of nephrotic syndrome 

who presents to the emergency department for evaluation of abnormal labs.  

Patient follows with Dr. Cortez.  Patient had routine labs drawn 2 days ago.  

Patient states beginning of the week he has felt very nauseous with a couple 

episodes of vomiting therefore has not been compliant with a cyclosporine.  

Patient has history of noncompliance.  States he has been compliant with 

prednisone daily.  Denies fever, chills, upper respiratory symptoms, chest pain,

shortness of breath, abdominal pain, extremity swelling.








- Related Data


                                  Previous Rx's











 Medication  Instructions  Recorded


 


cycloSPORINE [SandIMMUNE] 200 mg PO BID 30 Days #60 cap 05/15/22


 


predniSONE 30 mg PO DAILY 15 Days #15 tab 05/15/22











                                    Allergies











Allergy/AdvReac Type Severity Reaction Status Date / Time


 


Penicillins Allergy  Family Verified 09/02/22 15:16





   History  














Review of Systems


ROS Statement: 


Those systems with pertinent positive or pertinent negative responses have been 

documented in the HPI.





ROS Other: All systems not noted in ROS Statement are negative.





Past Medical History


Past Medical History: Asthma, Renal Disease


Additional Past Medical History / Comment(s): nephrotic syndrome, asthma as a 

child


History of Any Multi-Drug Resistant Organisms: None Reported


Past Surgical History: No Surgical Hx Reported


Additional Past Surgical History / Comment(s): L renal parenchymal biopsy


Past Anesthesia/Blood Transfusion Reactions: Unable to Obtain


Additional Past Anesthesia/Blood Transfusion Reaction / Comment(s): Pt has never

 had surgery


Past Psychological History: No Psychological Hx Reported


Smoking Status: Never smoker


Past Alcohol Use History: None Reported


Past Drug Use History: None Reported





- Past Family History


  ** Mother


Additional Family Medical History / Comment(s): Mother has motion sickness.  Pt 

cannot recall any other hx.





  ** Father


History Unknown: Yes





General Exam


Limitations: no limitations


General appearance: alert, in no apparent distress


Head exam: Present: atraumatic, normocephalic, normal inspection


Eye exam: Present: normal appearance, PERRL, EOMI.  Absent: scleral icterus, 

conjunctival injection, periorbital swelling


Respiratory exam: Present: normal lung sounds bilaterally.  Absent: respiratory 

distress, wheezes, rales, rhonchi, stridor


Cardiovascular Exam: Present: regular rate, normal rhythm, normal heart sounds. 

 Absent: systolic murmur, diastolic murmur, rubs, gallop, clicks


Extremities exam: Present: normal inspection, full ROM.  Absent: pedal edema


Neurological exam: Present: alert, oriented X3, CN II-XII intact


Psychiatric exam: Present: normal affect, normal mood


Skin exam: Present: warm, dry, intact, normal color.  Absent: rash





Course


                                   Vital Signs











  09/02/22 09/02/22





  15:12 18:41


 


Temperature 97.8 F 


 


Pulse Rate 78 67


 


Respiratory 18 18





Rate  


 


Blood Pressure 139/83 130/85


 


O2 Sat by Pulse 99 99





Oximetry  














Medical Decision Making





- Medical Decision Making


This 22-year-old male sent in for evaluation of abnormal renal labs.  Thorough 

history and examination were performed.  Patient well-appearing.  No extremity 

swelling. No anasarca.








Laboratory studies reveal significant ALEXIS. Creatinine at 3.10, baseline around 

0.9.  Urinalysis shows 4+ protein and moderate blood. IV fluids initiated.








Patient admitted with consult to nephrology.  Dr. Maravilla accepts admission.  








Dr. Carlton is my attending. 





- Lab Data


Result diagrams: 


                                 09/02/22 17:46





                                 09/02/22 17:46


                                   Lab Results











  09/02/22 09/02/22 09/02/22 Range/Units





  17:46 17:46 17:46 


 


WBC  9.3    (3.8-10.6)  k/uL


 


RBC  5.82    (4.30-5.90)  m/uL


 


Hgb  16.1    (13.0-17.5)  gm/dL


 


Hct  49.4    (39.0-53.0)  %


 


MCV  84.8    (80.0-100.0)  fL


 


MCH  27.7    (25.0-35.0)  pg


 


MCHC  32.7    (31.0-37.0)  g/dL


 


RDW  12.8    (11.5-15.5)  %


 


Plt Count  348    (150-450)  k/uL


 


MPV  7.4    


 


Neutrophils %  76    %


 


Lymphocytes %  14    %


 


Monocytes %  6    %


 


Eosinophils %  2    %


 


Basophils %  1    %


 


Neutrophils #  7.1    (1.3-7.7)  k/uL


 


Lymphocytes #  1.3    (1.0-4.8)  k/uL


 


Monocytes #  0.5    (0-1.0)  k/uL


 


Eosinophils #  0.2    (0-0.7)  k/uL


 


Basophils #  0.1    (0-0.2)  k/uL


 


Sodium    129 L  (137-145)  mmol/L


 


Potassium    3.8  (3.5-5.1)  mmol/L


 


Chloride    100  ()  mmol/L


 


Carbon Dioxide    25  (22-30)  mmol/L


 


Anion Gap    4  mmol/L


 


BUN    47 H  (9-20)  mg/dL


 


Creatinine    3.10 H  (0.66-1.25)  mg/dL


 


Est GFR (CKD-EPI)AfAm    31  (>60 ml/min/1.73 sqM)  


 


Est GFR (CKD-EPI)NonAf    27  (>60 ml/min/1.73 sqM)  


 


Glucose    100 H  (74-99)  mg/dL


 


Calcium    7.0 L  (8.4-10.2)  mg/dL


 


Total Bilirubin    0.2  (0.2-1.3)  mg/dL


 


AST    22  (17-59)  U/L


 


ALT    11  (4-49)  U/L


 


Alkaline Phosphatase    92  ()  U/L


 


Total Protein    4.3 L  (6.3-8.2)  g/dL


 


Albumin    2.0 L  (3.5-5.0)  g/dL


 


Urine Color   Yellow   


 


Urine Appearance   Cloudy   (Clear)  


 


Urine pH   6.5   (5.0-8.0)  


 


Ur Specific Gravity   1.032   (1.001-1.035)  


 


Urine Protein   4+ H   (Negative)  


 


Urine Glucose (UA)   Trace H   (Negative)  


 


Urine Ketones   Negative   (Negative)  


 


Urine Blood   Moderate H   (Negative)  


 


Urine Nitrite   Negative   (Negative)  


 


Urine Bilirubin   Negative   (Negative)  


 


Urine Urobilinogen   <2.0   (<2.0)  mg/dL


 


Ur Leukocyte Esterase   Negative   (Negative)  


 


Urine RBC   3   (0-5)  /hpf


 


Urine WBC   12 H   (0-5)  /hpf


 


Granular Casts   1   (0)  /lpf


 


Urine Mucus   Rare H   (None)  /hpf














Disposition


Clinical Impression: 


 Nephrotic syndrome, ALEXIS (acute kidney injury), Protein, urine, abnormal 

presence





Disposition: ADMITTED AS IP TO THIS [facility]


Condition: Fair


Referrals: 


Truong Agosto DO [Primary Care Provider] - 1-2 days


Decision Time: 18:45

## 2022-09-03 LAB
ALBUMIN SERPL-MCNC: 2 G/DL (ref 3.8–4.9)
ALBUMIN/GLOB SERPL: 1.11 G/DL (ref 1.6–3.17)
ALP SERPL-CCNC: 84 U/L (ref 41–126)
ALT SERPL-CCNC: 11 U/L (ref 10–49)
ANION GAP SERPL CALC-SCNC: 8.8 MMOL/L (ref 10–18)
AST SERPL-CCNC: 18 U/L (ref 14–35)
BASOPHILS # BLD AUTO: 0.01 X 10*3/UL (ref 0–0.1)
BASOPHILS NFR BLD AUTO: 0.1 %
BUN SERPL-SCNC: 46.2 MG/DL (ref 9–27)
BUN/CREAT SERPL: 15.4 RATIO (ref 12–20)
CALCIUM SPEC-MCNC: 7.5 MG/DL (ref 8.7–10.3)
CHLORIDE SERPL-SCNC: 100 MMOL/L (ref 96–109)
CO2 SERPL-SCNC: 21.2 MMOL/L (ref 20–27.5)
EOSINOPHIL # BLD AUTO: 0 X 10*3/UL (ref 0.04–0.35)
EOSINOPHIL NFR BLD AUTO: 0 %
ERYTHROCYTE [DISTWIDTH] IN BLOOD BY AUTOMATED COUNT: 5.55 X 10*6/UL (ref 4.4–5.6)
ERYTHROCYTE [DISTWIDTH] IN BLOOD: 12.4 % (ref 11.5–14.5)
GLOBULIN SER CALC-MCNC: 1.8 G/DL (ref 1.6–3.3)
GLUCOSE SERPL-MCNC: 156 MG/DL (ref 70–110)
HCT VFR BLD AUTO: 45.6 % (ref 39.6–50)
HGB BLD-MCNC: 15.4 G/DL (ref 13–17)
IMM GRANULOCYTES BLD QL AUTO: 0.4 %
LYMPHOCYTES # SPEC AUTO: 0.41 X 10*3/UL (ref 0.9–5)
LYMPHOCYTES NFR SPEC AUTO: 5.1 %
MCH RBC QN AUTO: 27.7 PG (ref 27–32)
MCHC RBC AUTO-ENTMCNC: 33.8 G/DL (ref 32–37)
MCV RBC AUTO: 82.2 FL (ref 80–97)
MONOCYTES # BLD AUTO: 0.04 X 10*3/UL (ref 0.2–1)
MONOCYTES NFR BLD AUTO: 0.5 %
NEUTROPHILS # BLD AUTO: 7.49 X 10*3/UL (ref 1.8–7.7)
NEUTROPHILS NFR BLD AUTO: 93.9 %
NRBC BLD AUTO-RTO: 0 /100 WBCS (ref 0–0)
PLATELET # BLD AUTO: 349 X 10*3/UL (ref 140–440)
POTASSIUM SERPL-SCNC: 4.5 MMOL/L (ref 3.5–5.5)
PROT SERPL-MCNC: 3.8 G/DL (ref 6.2–8.2)
SODIUM SERPL-SCNC: 130 MMOL/L (ref 135–145)
WBC # BLD AUTO: 7.98 X 10*3/UL (ref 4.5–10)

## 2022-09-03 RX ADMIN — FAMOTIDINE SCH MG: 20 TABLET, FILM COATED ORAL at 21:13

## 2022-09-03 NOTE — P.HPIM
History of Present Illness





22-year-old pleasant male with a known history of nephrotic syndrome minimal-

change disease was on prednisone and cyclosporine came in with the renal failure

secondary to noncompliance patient has not been taking cyclosporine prednisone 

as they as they feel him sick cyclosporine makes him nauseous.  Patient had came

in with similar complaints in the past and it was believed prednisone may recall

causing gases with disease because of which the patient was put on the Pepcid.  

Patient has 4+ protein in the urine creatinine is 3.1 patient was resumed on 

cyclosporine in the prednisone dose was increased.  Patient was complaining of 

nausea for couple days











REVIEW OF SYSTEMS: 


CONSTITUTIONAL: No fever, no malaise, no fatigue. 


HEENT: No recent visual problems or hearing problems. Denied any sore throat. 


CARDIOVASCULAR: No chest pain, orthopnea, PND, no palpitations, no syncope. 


PULMONARY: No shortness of breath, no cough, no hemoptysis. 


GASTROINTESTINAL: No diarrhea,no abdominal pain. 


NEUROLOGICAL: No headaches, no weakness, no numbness. 


HEMATOLOGICAL: Denies any bleeding or petechiae. 


GENITOURINARY: Denies any burning micturition, frequency, or urgency. 


MUSCULOSKELETAL/RHEUMATOLOGICAL: Denies any joint pain, swelling, or any muscle 

pain. 


ENDOCRINE: Denies any polyuria or polydipsia. 





The rest of the 14-point review of systems is negative.











PHYSICAL EXAMINATION: 





GENERAL: The patient is alert and oriented x3, not in any acute distress. Well 

developed, well nourished. 


HEENT: Pupils are round and equally reacting to light. EOMI. No scleral icterus.

No conjunctival pallor. Normocephalic, atraumatic. No pharyngeal erythema. No 

thyromegaly. 


CARDIOVASCULAR: S1 and S2 present. No murmurs, rubs, or gallops. 


PULMONARY: Chest is clear to auscultation, no wheezing or crackles. 


ABDOMEN: Soft, nontender, nondistended, normoactive bowel sounds. No palpable 

organomegaly. 


MUSCULOSKELETAL: No joint swelling or deformity.


EXTREMITIES: No cyanosis, clubbing, or pedal edema. 


NEUROLOGICAL: Gross neurological examination did not reveal any focal deficits. 


SKIN: No rashes. 





Assessment and plan


-Acute renal failure secondary to relapse of nephrotic syndrome which is again 

secondary to noncompliance with medications.  Counseling was provided patient 

will be monitored overnight continue with cephalosporin and the increase the 

dose of prednisone patient will be started on Pepcid


-Nausea vomiting secondary to renal failure and possibly gastritis next and-

hyponatremia secondary to renal failure from nephrotic syndrome 


DVT prophylaxis: Early ambulation





Past Medical History


Past Medical History: Asthma, Renal Disease


Additional Past Medical History / Comment(s): nephrotic syndrome, asthma as a 

child


History of Any Multi-Drug Resistant Organisms: None Reported


Past Surgical History: No Surgical Hx Reported


Additional Past Surgical History / Comment(s): L renal parenchymal biopsy


Past Anesthesia/Blood Transfusion Reactions: Unable to Obtain


Additional Past Anesthesia/Blood Transfusion Reaction / Comment(s): Pt has never

had surgery


Past Psychological History: No Psychological Hx Reported


Additional Psychological History / Comment(s): Pt resides with his grandmother 

and other family members.  He is independent.


Smoking Status: Never smoker


Past Alcohol Use History: None Reported


Past Drug Use History: None Reported





- Past Family History


  ** Mother


Additional Family Medical History / Comment(s): Mother has motion sickness.  Pt 

cannot recall any other hx.





  ** Father


History Unknown: Yes





Medications and Allergies


                                Home Medications











 Medication  Instructions  Recorded  Confirmed  Type


 


cycloSPORINE [cycloSPORINE ( mg PO DAILY 09/02/22 09/02/22 History





for SandIMMUNE) 100MG]    


 


predniSONE 10 mg PO DAILY 09/02/22 09/02/22 History








                                    Allergies











Allergy/AdvReac Type Severity Reaction Status Date / Time


 


Penicillins Allergy  Family Verified 09/02/22 19:29





   History  














Physical Exam


Vitals: 


                                   Vital Signs











  Temp Pulse Pulse Resp BP BP Pulse Ox


 


 09/03/22 07:00  97.7 F   65  18   143/70  98


 


 09/02/22 20:02   67   16  131/78   99


 


 09/02/22 18:41   67   18  130/85   99


 


 09/02/22 15:12  97.8 F  78   18  139/83   99








                                Intake and Output











 09/02/22 09/03/22 09/03/22





 22:59 06:59 14:59


 


Other:   


 


  # Voids  0 


 


  Weight 104.326 kg 104.326 kg 














Results


CBC & Chem 7: 


                                 09/03/22 05:21





                                 09/03/22 05:21


Labs: 


                  Abnormal Lab Results - Last 24 Hours (Table)











  09/02/22 09/02/22 09/03/22 Range/Units





  17:46 17:46 05:21 


 


Lymphocytes #    0.41 L  (0.90-5.00)  X 10*3/uL


 


Monocytes #    0.04 L  (0.20-1.00)  X 10*3/uL


 


Eosinophils #    0 L  (0.04-0.35)  X 10*3/uL


 


Sodium   129 L   (137-145)  mmol/L


 


Anion Gap     (10.00-18.00)  mmol/L


 


BUN   47 H   (9-20)  mg/dL


 


Creatinine   3.10 H   (0.66-1.25)  mg/dL


 


Est GFR (CKD-EPI)AfAm     (60.0-200.0)   


 


Est GFR (CKD-EPI)NonAf     (60.0-200.0)   


 


Glucose   100 H   (74-99)  mg/dL


 


Calcium   7.0 L   (8.4-10.2)  mg/dL


 


Total Bilirubin     (0.30-1.20)  mg/dL


 


Total Protein   4.3 L   (6.3-8.2)  g/dL


 


Albumin   2.0 L   (3.5-5.0)  g/dL


 


Albumin/Globulin Ratio     (1.60-3.17)  g/dL


 


Urine Protein  4+ H    (Negative)  


 


Urine Glucose (UA)  Trace H    (Negative)  


 


Urine Blood  Moderate H    (Negative)  


 


Urine WBC  12 H    (0-5)  /hpf


 


Urine Mucus  Rare H    (None)  /hpf














  09/03/22 Range/Units





  05:21 


 


Lymphocytes #   (0.90-5.00)  X 10*3/uL


 


Monocytes #   (0.20-1.00)  X 10*3/uL


 


Eosinophils #   (0.04-0.35)  X 10*3/uL


 


Sodium  130 L  (137-145)  mmol/L


 


Anion Gap  8.80 L  (10.00-18.00)  mmol/L


 


BUN  46.2 H  (9-20)  mg/dL


 


Creatinine  3.0 H  (0.66-1.25)  mg/dL


 


Est GFR (CKD-EPI)AfAm  32.7 L  (60.0-200.0)   


 


Est GFR (CKD-EPI)NonAf  28.2 L  (60.0-200.0)   


 


Glucose  156 H  (74-99)  mg/dL


 


Calcium  7.5 L  (8.4-10.2)  mg/dL


 


Total Bilirubin  <0.15 L  (0.30-1.20)  mg/dL


 


Total Protein  3.8 L  (6.3-8.2)  g/dL


 


Albumin  2.0 L  (3.5-5.0)  g/dL


 


Albumin/Globulin Ratio  1.11 L  (1.60-3.17)  g/dL


 


Urine Protein   (Negative)  


 


Urine Glucose (UA)   (Negative)  


 


Urine Blood   (Negative)  


 


Urine WBC   (0-5)  /hpf


 


Urine Mucus   (None)  /hpf

## 2022-09-03 NOTE — P.NPCON
History of Present Illness





- Reason for Consult


acute renal failure





- History of Present Illness





Patient is a 21-year-old male with history of nephrotic syndrome secondary to 

minimal-change disease status post biopsy in 2020.  Patient is maintained on 

prednisone and cyclosporine.


He has a history of noncompliance


Patient is admitted to the hospital with complaints of nausea and vomiting.  He 

states that when his cyclosporine was recently refilled it was a new medication 

and caused him to have significant nausea and therefore he has not been taking 

it.


Patient also reported that he has also missed doses of prednisone.  Next





Patient was hospitalized in May 2022 with acute kidney injury and relapse of 

nephrotic syndrome.  He was started on prednisone and discharged with serum 

creatinine at that time at 1.4 from a peak of 2.0 mg/dL.





Serum creatinine this admission was 3.1 mg/dL.  UA shows 4+ protein.  Albumin is

2.0


No complaints of fever chills or abdominal pain no cough.








Review of Systems





As per HPI other systems negative





Past Medical History


Past Medical History: Asthma, Renal Disease


Additional Past Medical History / Comment(s): nephrotic syndrome, asthma as a 

child


History of Any Multi-Drug Resistant Organisms: None Reported


Past Surgical History: No Surgical Hx Reported


Additional Past Surgical History / Comment(s): L renal parenchymal biopsy


Past Anesthesia/Blood Transfusion Reactions: Unable to Obtain


Additional Past Anesthesia/Blood Transfusion Reaction / Comment(s): Pt has never

had surgery


Past Psychological History: No Psychological Hx Reported


Additional Psychological History / Comment(s): Pt resides with his grandmother 

and other family members.  He is independent.


Smoking Status: Never smoker


Past Alcohol Use History: None Reported


Past Drug Use History: None Reported





- Past Family History


  ** Mother


Additional Family Medical History / Comment(s): Mother has motion sickness.  Pt 

cannot recall any other hx.





  ** Father


History Unknown: Yes





Medications and Allergies


                                Home Medications











 Medication  Instructions  Recorded  Confirmed  Type


 


cycloSPORINE [cycloSPORINE ( mg PO DAILY 09/02/22 09/02/22 History





for SandIMMUNE) 100MG]    


 


predniSONE 10 mg PO DAILY 09/02/22 09/02/22 History








                                    Allergies











Allergy/AdvReac Type Severity Reaction Status Date / Time


 


Penicillins Allergy  Family Verified 09/02/22 19:29





   History  














Physical Exam


Vitals: 


                                   Vital Signs











  Temp Pulse Pulse Resp BP BP Pulse Ox


 


 09/03/22 07:00  97.7 F   65  18   143/70  98


 


 09/02/22 20:02   67   16  131/78   99


 


 09/02/22 18:41   67   18  130/85   99


 


 09/02/22 15:12  97.8 F  78   18  139/83   99








                                Intake and Output











 09/02/22 09/03/22 09/03/22





 22:59 06:59 14:59


 


Other:   


 


  # Voids  0 


 


  Weight 104.326 kg 104.326 kg 














Patient is comfortable, awake, not in any acute distress


Examination of the heart S1 and S2


Examination lungs bilateral breath sounds are heard


Abdomen is soft nontender


Examination of the lower extremities shows edema 1+ bilaterally


CNS exam grossly intact





Results





- Lab Results


                             Most recent lab results











Calcium  7.5 mg/dL (8.7-10.3)  L  09/03/22  05:21    














                                 09/03/22 05:21





                                 09/03/22 05:21





Assessment and Plan


Assessment: 





1.  Acute kidney injury associated with relapse of nephrotic syndrome.  History 

of kidney biopsy in 2020 which showed minimal change disease.  Patient had been 

in remission with last 24 hour urine protein of 103 mg in July 2021.  Admitted 

with relapse in May most likely associated with noncompliance with medications. 

Patient is supposed to be maintained on prednisone and cyclosporine.  Baseline 

creatinine 0.9-1 mg/dL.  Ultrasound done in May 2022 showed no major 

abnormalities


2.  Nausea and vomiting most likely related to gastritis.  Patient was not on 

any proton pump inhibitors recently.  These were started during his 

hospitalization in May


3.  Hyponatremia associated with nephrotic syndrome and acute kidney injury


Plan: 





Maintained off of IV fluids


Check lipid profile


Check 24 hour urine for protein


Resume cyclosporine


Resume prednisone


Add Pepcid 





Thank you for the consultation.  We'll continue to follow the patient with you 

during his hospitalization

## 2022-09-04 LAB
ANION GAP SERPL CALC-SCNC: 8 MMOL/L (ref 10–18)
BUN SERPL-SCNC: 58.1 MG/DL (ref 9–27)
BUN/CREAT SERPL: 20.53 RATIO (ref 12–20)
CALCIUM SPEC-MCNC: 7.6 MG/DL (ref 8.7–10.3)
CHLORIDE SERPL-SCNC: 102 MMOL/L (ref 96–109)
CO2 SERPL-SCNC: 22.1 MMOL/L (ref 20–27.5)
GLUCOSE SERPL-MCNC: 121 MG/DL (ref 70–110)
POTASSIUM SERPL-SCNC: 4.3 MMOL/L (ref 3.5–5.5)
SODIUM SERPL-SCNC: 132 MMOL/L (ref 135–145)

## 2022-09-04 RX ADMIN — FAMOTIDINE SCH MG: 20 TABLET, FILM COATED ORAL at 09:38

## 2022-09-04 RX ADMIN — FAMOTIDINE SCH MG: 20 TABLET, FILM COATED ORAL at 20:32

## 2022-09-04 NOTE — P.PN
Subjective





22-year-old pleasant male with a known history of nephrotic syndrome minimal-

change disease was on prednisone and cyclosporine came in with the renal failure

secondary to noncompliance patient has not been taking cyclosporine prednisone 

as they as they feel him sick cyclosporine makes him nauseous.  Patient had came

in with similar complaints in the past and it was believed prednisone may recall

causing gases with disease because of which the patient was put on the Pepcid.  

Patient has 4+ protein in the urine creatinine is 3.1 patient was resumed on 

cyclosporine in the prednisone dose was increased.  Patient was complaining of 

nausea for couple days











09/04/2022


There is slight improvement in serum creatinine patient will be continued on 

steroids and cyclosporine.


Constitutional: Denied any fatigue denied any fever.


Cardio vascular: denied any chest pain, palpitations


Gastrointestinal denied any nausea vomiting


Pulmonary: Denied any shortness of breath cough


Neurologic denied any new focal deficits





All inpatient medications were reviewed and appropriate changes in these 

medications as dictated in the interval history and assessment and plan.














PHYSICAL EXAMINATION: 





GENERAL: The patient is alert and oriented x3, not in any acute distress. Well 

developed, well nourished. 


HEENT: Pupils are round and equally reacting to light. EOMI. No scleral icterus.

No conjunctival pallor. Normocephalic, atraumatic. No pharyngeal erythema. No 

thyromegaly. 


CARDIOVASCULAR: S1 and S2 present. No murmurs, rubs, or gallops. 


PULMONARY: Chest is clear to auscultation, no wheezing or crackles. 


ABDOMEN: Soft, nontender, nondistended, normoactive bowel sounds. No palpable 

organomegaly. 


MUSCULOSKELETAL: No joint swelling or deformity.


EXTREMITIES: No cyanosis, clubbing, or pedal edema. 


NEUROLOGICAL: Gross neurological examination did not reveal any focal deficits. 


SKIN: No rashes. 





Assessment and plan


-Acute renal failure secondary to relapse of nephrotic syndrome which is again 

secondary to noncompliance with medications.  Counseling was provided patient 

will be monitored overnight continue with cephalosporin and the increase the 

dose of prednisone patient will be started on Pepcid


-Nausea vomiting secondary to renal failure and possibly gastritis next and-

hyponatremia secondary to renal failure from nephrotic syndrome 


DVT prophylaxis: Early ambulation








Objective





- Vital Signs


Vital signs: 


                                   Vital Signs











Temp  97.7 F   09/04/22 08:00


 


Pulse  63   09/04/22 08:00


 


Resp  18   09/04/22 08:00


 


BP  125/69   09/04/22 08:00


 


Pulse Ox  99   09/04/22 08:00


 


FiO2      








                                 Intake & Output











 09/03/22 09/04/22 09/04/22





 18:59 06:59 18:59


 


Intake Total 118  118


 


Output Total 0  


 


Balance 118  118


 


Intake:   


 


  Oral 118  118


 


Output:   


 


  Emesis 0  


 


Other:   


 


  # Voids  2 














- Labs


CBC & Chem 7: 


                                 09/03/22 05:21





                                 09/04/22 06:10


Labs: 


                  Abnormal Lab Results - Last 24 Hours (Table)











  09/04/22 Range/Units





  06:10 


 


Sodium  132 L  (135-145)  mmol/L


 


Anion Gap  8.00 L  (10.00-18.00)  mmol/L


 


BUN  58.1 H  (9.0-27.0)  mg/dL


 


Creatinine  2.8 H  (0.6-1.5)  mg/dL


 


Est GFR (CKD-EPI)AfAm  35.1 L  (60.0-200.0)   


 


Est GFR (CKD-EPI)NonAf  30.2 L  (60.0-200.0)   


 


BUN/Creatinine Ratio  20.53 H  (12.00-20.00)  Ratio


 


Glucose  121 H  ()  mg/dL


 


Calcium  7.6 L  (8.7-10.3)  mg/dL

## 2022-09-04 NOTE — P.PN
Subjective





Patient is seen for follow-up for acute kidney injury and relapse of nephrotic 

syndrome from biopsy-proven minimal-change disease in 2020 mostly due to 

noncompliance with medications.


Patient has been restarted on prednisone at a slightly higher dose along with 

his cyclosporine.  24 hour urine for protein is in progress.


This morning patient states his abdominal pain has improved.  He is able to 

tolerate oral intake.


Labs are pending from today








Objective





- Vital Signs


Vital signs: 


                                   Vital Signs











Temp  97.7 F   09/04/22 08:00


 


Pulse  63   09/04/22 08:00


 


Resp  18   09/04/22 08:00


 


BP  125/69   09/04/22 08:00


 


Pulse Ox  99   09/04/22 08:00


 


FiO2      








                                 Intake & Output











 09/03/22 09/04/22 09/04/22





 18:59 06:59 18:59


 


Intake Total 118  118


 


Output Total 0  


 


Balance 118  118


 


Intake:   


 


  Oral 118  118


 


Output:   


 


  Emesis 0  


 


Other:   


 


  # Voids  2 














- Exam





Awake alert oriented 3 not in any acute distress


Examination of the heart S1 and S2


Examination of the lungs bilateral breath sounds are heard


Abdomen is soft nontender


Examination of the lower extremity shows edema 1+ bilaterally


CNS exam grossly intact





- Labs


CBC & Chem 7: 


                                 09/03/22 05:21





                                 09/03/22 05:21





Assessment and Plan


Assessment: 





1.  Acute kidney injury associated with relapse of nephrotic syndrome.  History 

of kidney biopsy in 2020 which showed minimal change disease.  Patient had been 

in remission with last 24 hour urine protein of 103 mg in July 2021.  Admitted 

with relapse in May most likely associated with noncompliance with medications. 

Patient is supposed to be maintained on prednisone and cyclosporine.  Baseline 

creatinine 0.9-1 mg/dL.  Ultrasound done in May 2022 showed no major 

abnormalities.  Admitted again with noncompliance with medication.


2.  Nausea and vomiting most likely related to gastritis.  Patient was not on 

any proton pump inhibitors recently.  These were started during his 

hospitalization in May


3.  Hyponatremia associated with nephrotic syndrome and acute kidney injury


Plan: 





Continue prednisone and cyclosporine


Will follow-up on the 24 hour urine result


Follow-up on labs from today

## 2022-09-05 VITALS — SYSTOLIC BLOOD PRESSURE: 135 MMHG | HEART RATE: 85 BPM | TEMPERATURE: 97.6 F | DIASTOLIC BLOOD PRESSURE: 76 MMHG

## 2022-09-05 VITALS — RESPIRATION RATE: 18 BRPM

## 2022-09-05 LAB
ANION GAP SERPL CALC-SCNC: 5.4 MMOL/L (ref 10–18)
BUN SERPL-SCNC: 51.7 MG/DL (ref 9–27)
BUN/CREAT SERPL: 27.21 RATIO (ref 12–20)
CALCIUM SPEC-MCNC: 7.2 MG/DL (ref 8.7–10.3)
CHLORIDE SERPL-SCNC: 107 MMOL/L (ref 96–109)
CO2 SERPL-SCNC: 21.6 MMOL/L (ref 20–27.5)
GLUCOSE SERPL-MCNC: 106 MG/DL (ref 70–110)
POTASSIUM SERPL-SCNC: 4.2 MMOL/L (ref 3.5–5.5)
SODIUM SERPL-SCNC: 134 MMOL/L (ref 135–145)

## 2022-09-05 RX ADMIN — FAMOTIDINE SCH MG: 20 TABLET, FILM COATED ORAL at 08:50

## 2022-09-05 NOTE — P.PN
Subjective





Patient is seen for follow-up for acute kidney injury and relapse of nephrotic 

syndrome from biopsy-proven minimal-change disease in 2020 mostly due to 

noncompliance with medications.


Patient has been restarted on prednisone at a slightly higher dose along with 

his cyclosporine.  24 hour urine for protein is in progress.





This morning patient states his abdominal pain has improved.  He is able to 

tolerate oral intake.


Serum creatinine down to 1.9 today








Objective





- Vital Signs


Vital signs: 


                                   Vital Signs











Temp  97.9 F   09/05/22 07:56


 


Pulse  72   09/05/22 07:56


 


Resp  18   09/05/22 07:56


 


BP  113/64   09/05/22 07:56


 


Pulse Ox  99   09/05/22 07:56


 


FiO2      








                                 Intake & Output











 09/04/22 09/05/22 09/05/22





 18:59 06:59 18:59


 


Intake Total 318  


 


Balance 318  


 


Intake:   


 


  Oral 318  


 


Other:   


 


  # Voids 1 2 














- Exam





Awake alert oriented 3 not in any acute distress


Examination of the heart S1 and S2


Examination of the lungs bilateral breath sounds are heard


Abdomen is soft nontender


Examination of the lower extremity shows edema 1+ bilaterally


CNS exam grossly intact





- Labs


CBC & Chem 7: 


                                 09/03/22 05:21





                                 09/05/22 06:06


Labs: 


                  Abnormal Lab Results - Last 24 Hours (Table)











  09/04/22 09/05/22 Range/Units





  06:10 06:06 


 


Sodium  132 L  134 L  (135-145)  mmol/L


 


Anion Gap  8.00 L  5.40 L  (10.00-18.00)  mmol/L


 


BUN  58.1 H  51.7 H  (9.0-27.0)  mg/dL


 


Creatinine  2.8 H  1.9 H  (0.6-1.5)  mg/dL


 


Est GFR (CKD-EPI)AfAm  35.1 L  56.7 L  (60.0-200.0)   


 


Est GFR (CKD-EPI)NonAf  30.2 L  49.0 L  (60.0-200.0)   


 


BUN/Creatinine Ratio  20.53 H  27.21 H  (12.00-20.00)  Ratio


 


Glucose  121 H   ()  mg/dL


 


Calcium  7.6 L  7.2 L  (8.7-10.3)  mg/dL














Assessment and Plan


Assessment: 





1.  Acute kidney injury associated with relapse of nephrotic syndrome.  History 

of kidney biopsy in 2020 which showed minimal change disease.  Patient had been 

in remission with last 24 hour urine protein of 103 mg in July 2021.  Admitted 

with relapse in May most likely associated with noncompliance with medications. 

Patient is supposed to be maintained on prednisone and cyclosporine.  Baseline 

creatinine 0.9-1 mg/dL.  Ultrasound done in May 2022 showed no major 

abnormalities.  Admitted again with noncompliance with medication.


2.  Nausea and vomiting most likely related to gastritis.  Patient was not on 

any proton pump inhibitors recently.  These were started during his 

hospitalization in May


3.  Hyponatremia associated with nephrotic syndrome and acute kidney injury


Plan: 





IV Lasix 1.


Patient can be discharged from nephrology standpoint.  [place] he should follow-

up in the office in about 1 week's time.


Continue with current dose of prednisone and cyclosporine.  Prednisone dose will

be decreased in the office.


Follow-up on 24 hour urine result, it is still pending

## 2022-09-06 LAB — SPECIMEN VOL 24H UR: 1400 ML

## 2023-10-02 ENCOUNTER — HOSPITAL ENCOUNTER (OUTPATIENT)
Dept: HOSPITAL 47 - LABWHC1 | Age: 23
Discharge: HOME | End: 2023-10-02
Attending: NEUROLOGICAL SURGERY
Payer: COMMERCIAL

## 2023-10-02 DIAGNOSIS — N05.0: Primary | ICD-10-CM

## 2023-10-02 LAB
ALBUMIN SERPL-MCNC: 4.1 D/DL (ref 3.8–4.9)
ALBUMIN/GLOB SERPL: 1.95 RATIO (ref 1.6–3.17)
ALP SERPL-CCNC: 94 U/L (ref 41–126)
ALT SERPL-CCNC: 27 U/L (ref 10–49)
ANION GAP SERPL CALC-SCNC: 8.6 MMOL/L (ref 4–12)
AST SERPL-CCNC: 16 U/L (ref 14–35)
BUN SERPL-SCNC: 10.5 MG/DL (ref 9–27)
BUN/CREAT SERPL: 13.12 RATIO (ref 12–20)
CALCIUM SPEC-MCNC: 9.5 MG/DL (ref 8.7–10.3)
CHLORIDE SERPL-SCNC: 107 MMOL/L (ref 96–109)
CO2 SERPL-SCNC: 25.4 MMOL/L (ref 21.6–31.8)
ERYTHROCYTE [DISTWIDTH] IN BLOOD BY AUTOMATED COUNT: 5.41 X 10*6/UL (ref 4.4–5.6)
ERYTHROCYTE [DISTWIDTH] IN BLOOD: 12.9 % (ref 11.5–14.5)
GLOBULIN SER CALC-MCNC: 2.1 D/DL (ref 1.6–3.3)
GLUCOSE SERPL-MCNC: 103 MG/DL (ref 70–110)
HCT VFR BLD AUTO: 45.3 % (ref 39.6–50)
HGB BLD-MCNC: 14.9 D/DL (ref 13–17)
MAGNESIUM SPEC-SCNC: 1.8 MG/DL (ref 1.5–2.4)
MCH RBC QN AUTO: 27.5 PG (ref 27–32)
MCHC RBC AUTO-ENTMCNC: 32.9 D/DL (ref 32–37)
MCV RBC AUTO: 83.7 FL (ref 80–97)
NRBC BLD AUTO-RTO: 0 X 10*3/UL (ref 0–0.01)
PH UR: 7 [PH]
PLATELET # BLD AUTO: 270 X 10*3/UL (ref 140–440)
POTASSIUM SERPL-SCNC: 4.4 MMOL/L (ref 3.5–5.5)
PROT SERPL-MCNC: 6.2 D/DL (ref 6.2–8.2)
PROT/CREAT UR-RTO: 0.04
SODIUM SERPL-SCNC: 141 MMOL/L (ref 135–145)
SP GR UR: 1.03 (ref 1–1.03)
UROBILINOGEN UR QL: 4 E.U./DL
WBC # BLD AUTO: 5.69 X 10*3/UL (ref 4.5–10)

## 2023-10-02 PROCEDURE — 84156 ASSAY OF PROTEIN URINE: CPT

## 2023-10-02 PROCEDURE — 85027 COMPLETE CBC AUTOMATED: CPT

## 2023-10-02 PROCEDURE — 82570 ASSAY OF URINE CREATININE: CPT

## 2023-10-02 PROCEDURE — 81003 URINALYSIS AUTO W/O SCOPE: CPT

## 2023-10-02 PROCEDURE — 36415 COLL VENOUS BLD VENIPUNCTURE: CPT

## 2023-10-02 PROCEDURE — 80053 COMPREHEN METABOLIC PANEL: CPT

## 2023-10-02 PROCEDURE — 83735 ASSAY OF MAGNESIUM: CPT

## 2023-10-02 PROCEDURE — 84100 ASSAY OF PHOSPHORUS: CPT

## 2023-10-02 PROCEDURE — 80158 DRUG ASSAY CYCLOSPORINE: CPT

## 2023-10-20 ENCOUNTER — HOSPITAL ENCOUNTER (OUTPATIENT)
Dept: HOSPITAL 47 - LABWHC1 | Age: 23
Discharge: HOME | End: 2023-10-20
Attending: INTERNAL MEDICINE
Payer: COMMERCIAL

## 2023-10-20 DIAGNOSIS — N05.0: Primary | ICD-10-CM

## 2023-10-20 PROCEDURE — 36415 COLL VENOUS BLD VENIPUNCTURE: CPT

## 2023-10-20 PROCEDURE — 80158 DRUG ASSAY CYCLOSPORINE: CPT

## 2024-03-05 ENCOUNTER — HOSPITAL ENCOUNTER (EMERGENCY)
Dept: HOSPITAL 47 - EC | Age: 24
Discharge: HOME | End: 2024-03-05
Payer: COMMERCIAL

## 2024-03-05 VITALS — HEART RATE: 78 BPM | SYSTOLIC BLOOD PRESSURE: 136 MMHG | DIASTOLIC BLOOD PRESSURE: 76 MMHG

## 2024-03-05 VITALS — RESPIRATION RATE: 18 BRPM | TEMPERATURE: 98.2 F

## 2024-03-05 DIAGNOSIS — N04.9: ICD-10-CM

## 2024-03-05 DIAGNOSIS — N17.9: Primary | ICD-10-CM

## 2024-03-05 DIAGNOSIS — Z88.0: ICD-10-CM

## 2024-03-05 LAB
ALBUMIN SERPL-MCNC: 2.4 G/DL (ref 3.5–5)
ALP SERPL-CCNC: 115 U/L (ref 38–126)
ALT SERPL-CCNC: 12 U/L (ref 4–49)
ANION GAP SERPL CALC-SCNC: 2 MMOL/L
AST SERPL-CCNC: 19 U/L (ref 17–59)
BASOPHILS # BLD AUTO: 0 K/UL (ref 0–0.2)
BASOPHILS NFR BLD AUTO: 0 %
BUN SERPL-SCNC: 56 MG/DL (ref 9–20)
CALCIUM SPEC-MCNC: 7.7 MG/DL (ref 8.4–10.2)
CHLORIDE SERPL-SCNC: 105 MMOL/L (ref 98–107)
CK SERPL-CCNC: 65 U/L (ref 55–170)
CO2 SERPL-SCNC: 24 MMOL/L (ref 22–30)
EOSINOPHIL # BLD AUTO: 0.1 K/UL (ref 0–0.7)
EOSINOPHIL NFR BLD AUTO: 1 %
ERYTHROCYTE [DISTWIDTH] IN BLOOD BY AUTOMATED COUNT: 5.82 M/UL (ref 4.3–5.9)
ERYTHROCYTE [DISTWIDTH] IN BLOOD: 13 % (ref 11.5–15.5)
GLUCOSE SERPL-MCNC: 96 MG/DL (ref 74–99)
GRAN CASTS UR QL COMP ASSIST: 11 /LPF
HCT VFR BLD AUTO: 49.1 % (ref 39–53)
HGB BLD-MCNC: 17 GM/DL (ref 13–17.5)
LYMPHOCYTES # SPEC AUTO: 1.2 K/UL (ref 1–4.8)
LYMPHOCYTES NFR SPEC AUTO: 12 %
MAGNESIUM SPEC-SCNC: 2.5 MG/DL (ref 1.6–2.3)
MCH RBC QN AUTO: 29.1 PG (ref 25–35)
MCHC RBC AUTO-ENTMCNC: 34.6 G/DL (ref 31–37)
MCV RBC AUTO: 84.3 FL (ref 80–100)
MONOCYTES # BLD AUTO: 0.6 K/UL (ref 0–1)
MONOCYTES NFR BLD AUTO: 6 %
NEUTROPHILS # BLD AUTO: 8.4 K/UL (ref 1.3–7.7)
NEUTROPHILS NFR BLD AUTO: 80 %
PH UR: 6.5 [PH] (ref 5–8)
PLATELET # BLD AUTO: 335 K/UL (ref 150–450)
POTASSIUM SERPL-SCNC: 4.2 MMOL/L (ref 3.5–5.1)
PROT SERPL-MCNC: 4.9 G/DL (ref 6.3–8.2)
PROT UR QL: (no result)
RBC UR QL: 1 /HPF (ref 0–5)
SODIUM SERPL-SCNC: 131 MMOL/L (ref 137–145)
SP GR UR: 1.04 (ref 1–1.03)
SQUAMOUS UR QL AUTO: 2 /HPF (ref 0–4)
UROBILINOGEN UR QL STRIP: <2 MG/DL (ref ?–2)
WBC # BLD AUTO: 10.5 K/UL (ref 3.8–10.6)
WBC #/AREA URNS HPF: 6 /HPF (ref 0–5)

## 2024-03-05 PROCEDURE — 83735 ASSAY OF MAGNESIUM: CPT

## 2024-03-05 PROCEDURE — 81001 URINALYSIS AUTO W/SCOPE: CPT

## 2024-03-05 PROCEDURE — 80053 COMPREHEN METABOLIC PANEL: CPT

## 2024-03-05 PROCEDURE — 96360 HYDRATION IV INFUSION INIT: CPT

## 2024-03-05 PROCEDURE — 99284 EMERGENCY DEPT VISIT MOD MDM: CPT

## 2024-03-05 PROCEDURE — 96361 HYDRATE IV INFUSION ADD-ON: CPT

## 2024-03-05 PROCEDURE — 36415 COLL VENOUS BLD VENIPUNCTURE: CPT

## 2024-03-05 PROCEDURE — 82550 ASSAY OF CK (CPK): CPT

## 2024-03-05 PROCEDURE — 85025 COMPLETE CBC W/AUTO DIFF WBC: CPT

## 2024-03-05 PROCEDURE — 84100 ASSAY OF PHOSPHORUS: CPT

## 2024-03-05 RX ADMIN — CEFAZOLIN STA MLS/HR: 330 INJECTION, POWDER, FOR SOLUTION INTRAMUSCULAR; INTRAVENOUS at 17:18

## 2024-03-05 RX ADMIN — CEFAZOLIN STA MLS/HR: 330 INJECTION, POWDER, FOR SOLUTION INTRAMUSCULAR; INTRAVENOUS at 18:26

## 2024-03-05 NOTE — ED
Recheck HPI





- General


Chief Complaint: Recheck/Abnormal Lab/Rx


Stated Complaint: Abnormal Labs


Time Seen by Provider: 03/05/24 16:36


Source: patient, RN notes reviewed, old records reviewed, Caregiver


Mode of arrival: ambulatory


Limitations: no limitations





- History of Present Illness


Initial Comments: 





This is a 23-year-old male presents with parents for evaluation of worsening 

outpatient lab testing worsening renal failure worsening renal function with 

history of nephrotic syndrome.  Patient recently had diarrheal illness, 

significant nausea vomiting and diarrhea leading him to seek treatment with 

outpatient lab testing showing abnormal kidney function.  Patient (a for recheck

of your lab values, he states he feels improved able to eat and drink currently


MD Complaint: abnormal lab (Abnormal renal function)


-: days(s)


Returns Today for: Called Because of Abnormal Lab/Test


Symptoms Since Prior Visit: no new symptoms


Context: called for abnormal lab result


Associated Symptoms: none


Treatments Prior to Arrival: other (0)





- Related Data


                                Home Medications











 Medication  Instructions  Recorded  Confirmed


 


cycloSPORINE [SandIMMUNE] 300 mg PO DAILY 09/02/22 09/02/22








                                  Previous Rx's











 Medication  Instructions  Recorded


 


Famotidine [Pepcid] 20 mg PO BID 30 Days #60 tab 09/05/22


 


predniSONE [Deltasone] 40 mg PO DAILY 30 Days #60 tab 09/05/22











                                    Allergies











Allergy/AdvReac Type Severity Reaction Status Date / Time


 


Penicillins Allergy  Family Verified 03/05/24 16:33





   History  














Review of Systems


ROS Statement: 


Those systems with pertinent positive or pertinent negative responses have been 

documented in the HPI.





ROS Other: All systems not noted in ROS Statement are negative.





Past Medical History


Past Medical History: Asthma, Renal Disease


Additional Past Medical History / Comment(s): nephrotic syndrome, asthma as a 

child


History of Any Multi-Drug Resistant Organisms: None Reported


Past Surgical History: No Surgical Hx Reported


Additional Past Surgical History / Comment(s): L renal parenchymal biopsy


Past Anesthesia/Blood Transfusion Reactions: Unable to Obtain


Additional Past Anesthesia/Blood Transfusion Reaction / Comment(s): Pt has never

 had surgery


Past Psychological History: No Psychological Hx Reported


Smoking Status: Never smoker


Past Alcohol Use History: None Reported


Past Drug Use History: None Reported





- Past Family History


  ** Mother


Additional Family Medical History / Comment(s): Mother has motion sickness.  Pt 

cannot recall any other hx.





  ** Father


History Unknown: Yes





General Exam


Limitations: no limitations


General appearance: alert, in no apparent distress


Head exam: Present: atraumatic, normocephalic, normal inspection


Eye exam: Present: normal appearance, PERRL, EOMI.  Absent: scleral icterus, 

conjunctival injection, periorbital swelling


ENT exam: Present: normal exam, mucous membranes moist


Neck exam: Present: normal inspection.  Absent: tenderness, meningismus, 

lymphadenopathy


Respiratory exam: Present: normal lung sounds bilaterally.  Absent: respiratory 

distress, wheezes, rales, rhonchi, stridor


Cardiovascular Exam: Present: regular rate, normal rhythm, normal heart sounds. 

 Absent: systolic murmur, diastolic murmur, rubs, gallop, clicks


GI/Abdominal exam: Present: soft, normal bowel sounds.  Absent: distended, 

tenderness, guarding, rebound, rigid


Extremities exam: Present: normal inspection, full ROM, normal capillary refill.

  Absent: tenderness, pedal edema, joint swelling, calf tenderness


Back exam: Present: normal inspection


Neurological exam: Present: alert, oriented X3, CN II-XII intact


Psychiatric exam: Present: normal affect, normal mood


Skin exam: Present: warm, dry, intact, normal color.  Absent: rash





Course


                                   Vital Signs











  03/05/24 03/05/24





  16:30 19:45


 


Temperature 98.2 F 


 


Pulse Rate 85 78


 


Respiratory 18 18





Rate  


 


Blood Pressure 152/80 136/76


 


O2 Sat by Pulse 99 100





Oximetry  














- Reevaluation(s)


Reevaluation #1: 





03/05/24 16:37


Medical records reviewed


Reevaluation #2: 





Patient informed of results and questions answered


Reevaluation #3: 





Patient symptoms improved


Reevaluation #4: 





Was pt. sent in by a medical professional or institution (, PA, NP, urgent 

care, hospital, or nursing home...) When possible be specific


@  -no


Did you speak to anyone other than the patient for history (EMS, parent, family,

 police, friend...)? What history was obtained from this source 


@  -no


Did you review nursing and triage notes (agree or disagree)?  Why? 


@  -agree


Are old charts reviewed (outside hosp., previous admission, EMS record, old EKG,

 old radiological studies, urgent care reports/EKG's, nursing home records)? Rep

ort findings 


@  -yes


Differential Diagnosis (chest pain, altered mental status, abdominal pain women,

 abdominal pain men, vaginal bleeding, weakness, fever, dyspnea, syncope, 

headache, dizziness, GI bleed, back pain, seizure, CVA, palpatations, mental 

health, musculoskeletal)? 


@  -prior


EKG interpreted by me (3pts min.).


@  -no


X-rays interpreted by me (1pt min.).


@  -no


CT interpreted by me (1pt min.).


@  -no


U/S interpreted by me (1pt. min.).


@  -no


What testing was considered but not performed or refused? (CT, X-rays, U/S, 

labs)? Why?


@  -none


What meds were considered but not given or refused? Why?


@  -none


Did you discuss the management of the patient with other professionals (osvaldo serrato i.e. , PA, NP, lab, RT, psych nurse, , , teacher, 

, )? Give summary


@  -no


Was smoking cessation discussed for >3mins.?


@  -no


Was critical care preformed (if so, how long)?


@  -no


Were there social determinants of health that impacted care today? How? 

(Homelessness, low income, unemployed, alcoholism, drug addiction, 

transportation, low edu. Level, literacy, decrease access to med. care, snf, 

rehab)?


@  -none


Was there de-escalation of care discussed even if they declined (Discuss DNR or 

withdrawal of care, Hospice)? DNR status


@  -no


What co-morbidities impacted this encounter? (DM, HTN, Smoking, COPD, CAD, 

Cancer, CVA, ARF, Chemo, Hep., AIDS, mental health diagnosis, sleep apnea, 

morbid obesity)?


@  -none


Was patient admitted / discharged? Hospital course, mention meds given and 

route, prescriptions, significant lab abnormalities, going to OR and other 

pertinent info.


@  - 23 male with history of nephrotic syndrome sent in for evaluation of 

abnormal lab test.  Patient recently had episode of nausea vomiting and diarrhea

 causing worsening renal failure, patient is given significant fluid rehydration

 here in the ER with improving kidney function and can be discharged home


Discharge


Undiagnosed new problem with uncertain prognosis?


@  -no


Drug Therapy requiring intensive monitoring for toxicity (Heparin, Nitro, 

Insulin, Cardizem)?


@  -no


Were any procedures done?


@  -no


Diagnosis/symptom?


@  -Renal failure dehydration nausea vomiting diarrhea


Acute, or Chronic, or Acute on Chronic?


@  -Acute


Uncomplicated (without systemic symptoms) or Complicated (systemic symptoms)?


@  -Complicated


Side effects of treatment?


@  -no


Exacerbation, Progression, or Severe Exacerbation?


@  -exacerbation


Poses a threat to life or bodily function? How? (Chest pain, USA, MI, pneumonia,

 PE, COPD, DKA, ARF, appy, cholecystitis, CVA, Diverticulitis, Homicidal, 

Suicidal, threat to staff... and all critical care pts)


@  -yes with significant renal failure





Medical Decision Making





- Medical Decision Making





23 male with history of nephrotic syndrome sent in for evaluation of abnormal 

lab test.  Patient recently had episode of nausea vomiting and diarrhea causing 

worsening renal failure, patient is given significant fluid rehydration here in 

the ER with improving kidney function and can be discharged home





- Lab Data


Result diagrams: 


                                 03/05/24 16:41





                                 03/05/24 16:41


                                   Lab Results











  03/05/24 03/05/24 03/05/24 Range/Units





  16:41 16:41 16:41 


 


WBC  10.5    (3.8-10.6)  k/uL


 


RBC  5.82    (4.30-5.90)  m/uL


 


Hgb  17.0    (13.0-17.5)  gm/dL


 


Hct  49.1    (39.0-53.0)  %


 


MCV  84.3    (80.0-100.0)  fL


 


MCH  29.1    (25.0-35.0)  pg


 


MCHC  34.6    (31.0-37.0)  g/dL


 


RDW  13.0    (11.5-15.5)  %


 


Plt Count  335    (150-450)  k/uL


 


MPV  7.5    


 


Neutrophils %  80    %


 


Lymphocytes %  12    %


 


Monocytes %  6    %


 


Eosinophils %  1    %


 


Basophils %  0    %


 


Neutrophils #  8.4 H    (1.3-7.7)  k/uL


 


Lymphocytes #  1.2    (1.0-4.8)  k/uL


 


Monocytes #  0.6    (0-1.0)  k/uL


 


Eosinophils #  0.1    (0-0.7)  k/uL


 


Basophils #  0.0    (0-0.2)  k/uL


 


Sodium    131 L  (137-145)  mmol/L


 


Potassium    4.2  (3.5-5.1)  mmol/L


 


Chloride    105  ()  mmol/L


 


Carbon Dioxide    24  (22-30)  mmol/L


 


Anion Gap    2  mmol/L


 


BUN    56 H  (9-20)  mg/dL


 


Creatinine    3.44 H  (0.66-1.25)  mg/dL


 


Est GFR (CKD-EPI)AfAm    27  (>60 ml/min/1.73 sqM)  


 


Est GFR (CKD-EPI)NonAf    24  (>60 ml/min/1.73 sqM)  


 


Glucose    96  (74-99)  mg/dL


 


Calcium    7.7 L  (8.4-10.2)  mg/dL


 


Phosphorus    5.2 H  (2.5-4.5)  mg/dL


 


Magnesium    2.5 H  (1.6-2.3)  mg/dL


 


Total Bilirubin    0.4  (0.2-1.3)  mg/dL


 


AST    19  (17-59)  U/L


 


ALT    12  (4-49)  U/L


 


Alkaline Phosphatase    115  ()  U/L


 


Creatine Kinase    65  ()  U/L


 


Total Protein    4.9 L  (6.3-8.2)  g/dL


 


Albumin    2.4 L  (3.5-5.0)  g/dL


 


Urine Color   Yellow   


 


Urine Appearance   Cloudy   (Clear)  


 


Urine pH   6.5   (5.0-8.0)  


 


Ur Specific Gravity   1.038 H   (1.001-1.035)  


 


Urine Protein   4+ H   (Negative)  


 


Urine Glucose (UA)   Trace H   (Negative)  


 


Urine Ketones   Trace H   (Negative)  


 


Urine Blood   Moderate H   (Negative)  


 


Urine Nitrite   Negative   (Negative)  


 


Urine Bilirubin   Negative   (Negative)  


 


Urine Urobilinogen   <2.0   (<2.0)  mg/dL


 


Ur Leukocyte Esterase   Negative   (Negative)  


 


Urine RBC   1   (0-5)  /hpf


 


Urine WBC   6 H   (0-5)  /hpf


 


Ur Squamous Epith Cells   2   (0-4)  /hpf


 


Granular Casts   11   (0)  /lpf


 


Urine Mucus   Rare H   (None)  /hpf














Disposition


Clinical Impression: 


 ALEXIS (acute kidney injury), Protein, urine, abnormal presence, Nephrotic 

syndrome





Disposition: HOME SELF-CARE


Condition: Fair


Instructions (If sedation given, give patient instructions):  Acute Kidney 

Injury (DC)


Is patient prescribed a controlled substance at d/c from ED?: No


Referrals: 


Truong Agosto DO [Primary Care Provider] - 1-2 days

## 2024-04-01 ENCOUNTER — HOSPITAL ENCOUNTER (OUTPATIENT)
Dept: HOSPITAL 47 - LABWHC1 | Age: 24
Discharge: HOME | End: 2024-04-01
Attending: INTERNAL MEDICINE
Payer: COMMERCIAL

## 2024-04-01 DIAGNOSIS — N05.0: Primary | ICD-10-CM

## 2024-04-01 PROCEDURE — 80053 COMPREHEN METABOLIC PANEL: CPT

## 2024-04-01 PROCEDURE — 81001 URINALYSIS AUTO W/SCOPE: CPT

## 2024-04-01 PROCEDURE — 82570 ASSAY OF URINE CREATININE: CPT

## 2024-04-01 PROCEDURE — 84156 ASSAY OF PROTEIN URINE: CPT

## 2024-04-01 PROCEDURE — 80158 DRUG ASSAY CYCLOSPORINE: CPT

## 2024-04-01 PROCEDURE — 36415 COLL VENOUS BLD VENIPUNCTURE: CPT

## 2024-04-02 LAB
ALBUMIN SERPL-MCNC: 3.6 G/DL (ref 3.8–4.9)
ALBUMIN/GLOB SERPL: 1.8 RATIO (ref 1.6–3.17)
ALP SERPL-CCNC: 79 U/L (ref 41–126)
ALT SERPL-CCNC: 18 U/L (ref 10–49)
ANION GAP SERPL CALC-SCNC: 9.6 MMOL/L (ref 4–12)
AST SERPL-CCNC: 16 U/L (ref 14–35)
BUN SERPL-SCNC: 13.8 MG/DL (ref 9–27)
BUN/CREAT SERPL: 17.25 RATIO (ref 12–20)
CALCIUM SPEC-MCNC: 9.3 MG/DL (ref 8.7–10.3)
CHLORIDE SERPL-SCNC: 107 MMOL/L (ref 96–109)
CO2 SERPL-SCNC: 24.4 MMOL/L (ref 21.6–31.8)
GLOBULIN SER CALC-MCNC: 2 G/DL (ref 1.6–3.3)
GLUCOSE SERPL-MCNC: 141 MG/DL (ref 70–110)
PH UR: 6 [PH]
POTASSIUM SERPL-SCNC: 4.2 MMOL/L (ref 3.5–5.5)
PROT SERPL-MCNC: 5.6 G/DL (ref 6.2–8.2)
SODIUM SERPL-SCNC: 141 MMOL/L (ref 135–145)
SP GR UR: 1.02 (ref 1–1.03)
UROBILINOGEN UR QL: 1 E.U./DL

## 2024-09-06 ENCOUNTER — HOSPITAL ENCOUNTER (OUTPATIENT)
Dept: HOSPITAL 47 - LABWHC1 | Age: 24
Discharge: HOME | End: 2024-09-06
Attending: INTERNAL MEDICINE
Payer: COMMERCIAL

## 2024-09-06 DIAGNOSIS — N05.0: Primary | ICD-10-CM

## 2024-09-06 PROCEDURE — 36415 COLL VENOUS BLD VENIPUNCTURE: CPT

## 2024-09-06 PROCEDURE — 80158 DRUG ASSAY CYCLOSPORINE: CPT
